# Patient Record
Sex: FEMALE | Race: WHITE | NOT HISPANIC OR LATINO | Employment: FULL TIME | URBAN - METROPOLITAN AREA
[De-identification: names, ages, dates, MRNs, and addresses within clinical notes are randomized per-mention and may not be internally consistent; named-entity substitution may affect disease eponyms.]

---

## 2017-01-08 ENCOUNTER — COMMUNICATION - HEALTHEAST (OUTPATIENT)
Dept: FAMILY MEDICINE | Facility: CLINIC | Age: 37
End: 2017-01-08

## 2017-01-08 DIAGNOSIS — J30.9 ALLERGIC RHINITIS: ICD-10-CM

## 2017-03-04 ENCOUNTER — OFFICE VISIT - HEALTHEAST (OUTPATIENT)
Dept: FAMILY MEDICINE | Facility: CLINIC | Age: 37
End: 2017-03-04

## 2017-03-04 DIAGNOSIS — S80.811A ABRASION, LOWER LEG, ANTERIOR, RIGHT, INITIAL ENCOUNTER: ICD-10-CM

## 2017-04-07 ENCOUNTER — OFFICE VISIT - HEALTHEAST (OUTPATIENT)
Dept: FAMILY MEDICINE | Facility: CLINIC | Age: 37
End: 2017-04-07

## 2017-04-07 DIAGNOSIS — L29.9 ITCHING: ICD-10-CM

## 2017-06-05 ENCOUNTER — OFFICE VISIT - HEALTHEAST (OUTPATIENT)
Dept: FAMILY MEDICINE | Facility: CLINIC | Age: 37
End: 2017-06-05

## 2017-06-05 ENCOUNTER — RECORDS - HEALTHEAST (OUTPATIENT)
Dept: GENERAL RADIOLOGY | Age: 37
End: 2017-06-05

## 2017-06-05 DIAGNOSIS — S99.911A RIGHT ANKLE INJURY, INITIAL ENCOUNTER: ICD-10-CM

## 2017-06-05 DIAGNOSIS — S99.911A UNSPECIFIED INJURY OF RIGHT ANKLE, INITIAL ENCOUNTER: ICD-10-CM

## 2017-06-05 DIAGNOSIS — S96.911A RIGHT ANKLE STRAIN, INITIAL ENCOUNTER: ICD-10-CM

## 2017-06-15 ENCOUNTER — COMMUNICATION - HEALTHEAST (OUTPATIENT)
Dept: FAMILY MEDICINE | Facility: CLINIC | Age: 37
End: 2017-06-15

## 2017-11-28 ENCOUNTER — OFFICE VISIT - HEALTHEAST (OUTPATIENT)
Dept: FAMILY MEDICINE | Facility: CLINIC | Age: 37
End: 2017-11-28

## 2017-11-28 DIAGNOSIS — R21 RASH: ICD-10-CM

## 2018-01-18 ENCOUNTER — COMMUNICATION - HEALTHEAST (OUTPATIENT)
Dept: FAMILY MEDICINE | Facility: CLINIC | Age: 38
End: 2018-01-18

## 2018-01-26 ENCOUNTER — COMMUNICATION - HEALTHEAST (OUTPATIENT)
Dept: FAMILY MEDICINE | Facility: CLINIC | Age: 38
End: 2018-01-26

## 2018-02-05 ENCOUNTER — COMMUNICATION - HEALTHEAST (OUTPATIENT)
Dept: FAMILY MEDICINE | Facility: CLINIC | Age: 38
End: 2018-02-05

## 2018-02-05 ENCOUNTER — OFFICE VISIT - HEALTHEAST (OUTPATIENT)
Dept: FAMILY MEDICINE | Facility: CLINIC | Age: 38
End: 2018-02-05

## 2018-02-05 DIAGNOSIS — F30.9 BIPOLAR I DISORDER, SINGLE MANIC EPISODE (H): ICD-10-CM

## 2018-02-05 DIAGNOSIS — J30.9 ALLERGIC RHINITIS: ICD-10-CM

## 2018-02-05 DIAGNOSIS — Z72.0 TOBACCO ABUSE: ICD-10-CM

## 2018-02-05 DIAGNOSIS — Z23 NEED FOR VACCINATION: ICD-10-CM

## 2018-02-05 DIAGNOSIS — N92.6 IRREGULAR MENSTRUAL CYCLE: ICD-10-CM

## 2018-02-05 DIAGNOSIS — J45.20 MILD INTERMITTENT ASTHMA WITHOUT COMPLICATION: ICD-10-CM

## 2018-02-05 DIAGNOSIS — R42 DIZZY SPELLS: ICD-10-CM

## 2018-02-05 LAB
FASTING STATUS PATIENT QL REPORTED: NO
GLUCOSE BLD-MCNC: 108 MG/DL (ref 74–125)
HCG UR QL: NEGATIVE
HGB BLD-MCNC: 13.2 G/DL (ref 12–16)
SP GR UR STRIP: >=1.03 (ref 1–1.03)
TSH SERPL DL<=0.005 MIU/L-ACNC: 0.82 UIU/ML (ref 0.3–5)

## 2018-03-21 ENCOUNTER — COMMUNICATION - HEALTHEAST (OUTPATIENT)
Dept: FAMILY MEDICINE | Facility: CLINIC | Age: 38
End: 2018-03-21

## 2018-04-24 ENCOUNTER — OFFICE VISIT - HEALTHEAST (OUTPATIENT)
Dept: FAMILY MEDICINE | Facility: CLINIC | Age: 38
End: 2018-04-24

## 2018-04-24 DIAGNOSIS — N39.0 UTI (URINARY TRACT INFECTION): ICD-10-CM

## 2018-04-24 DIAGNOSIS — R35.0 URINE FREQUENCY: ICD-10-CM

## 2018-04-24 DIAGNOSIS — J45.20 MILD INTERMITTENT ASTHMA WITHOUT COMPLICATION: ICD-10-CM

## 2018-04-24 LAB
ALBUMIN UR-MCNC: ABNORMAL MG/DL
APPEARANCE UR: ABNORMAL
BILIRUB UR QL STRIP: ABNORMAL
COLOR UR AUTO: YELLOW
GLUCOSE UR STRIP-MCNC: NEGATIVE MG/DL
HGB UR QL STRIP: ABNORMAL
KETONES UR STRIP-MCNC: ABNORMAL MG/DL
LEUKOCYTE ESTERASE UR QL STRIP: ABNORMAL
NITRATE UR QL: POSITIVE
PH UR STRIP: 5.5 [PH] (ref 5–8)
SP GR UR STRIP: >=1.03 (ref 1–1.03)
UROBILINOGEN UR STRIP-ACNC: ABNORMAL

## 2018-04-24 RX ORDER — ALBUTEROL SULFATE 90 UG/1
2 AEROSOL, METERED RESPIRATORY (INHALATION) EVERY 6 HOURS PRN
Qty: 1 INHALER | Refills: 1 | Status: SHIPPED | OUTPATIENT
Start: 2018-04-24 | End: 2021-12-29

## 2018-04-26 LAB — BACTERIA SPEC CULT: ABNORMAL

## 2018-05-30 ENCOUNTER — COMMUNICATION - HEALTHEAST (OUTPATIENT)
Dept: FAMILY MEDICINE | Facility: CLINIC | Age: 38
End: 2018-05-30

## 2018-05-30 DIAGNOSIS — F30.9 BIPOLAR I DISORDER, SINGLE MANIC EPISODE (H): ICD-10-CM

## 2018-05-30 DIAGNOSIS — F90.2 ATTENTION DEFICIT HYPERACTIVITY DISORDER (ADHD), COMBINED TYPE: ICD-10-CM

## 2018-05-30 DIAGNOSIS — F10.10 ALCOHOL ABUSE: ICD-10-CM

## 2018-05-30 RX ORDER — GABAPENTIN 600 MG/1
600 TABLET ORAL 3 TIMES DAILY
Qty: 90 TABLET | Refills: 0 | Status: SHIPPED | OUTPATIENT
Start: 2018-05-30 | End: 2021-12-29

## 2018-06-15 ENCOUNTER — COMMUNICATION - HEALTHEAST (OUTPATIENT)
Dept: FAMILY MEDICINE | Facility: CLINIC | Age: 38
End: 2018-06-15

## 2018-07-03 ENCOUNTER — COMMUNICATION - HEALTHEAST (OUTPATIENT)
Dept: FAMILY MEDICINE | Facility: CLINIC | Age: 38
End: 2018-07-03

## 2018-07-03 ENCOUNTER — OFFICE VISIT - HEALTHEAST (OUTPATIENT)
Dept: FAMILY MEDICINE | Facility: CLINIC | Age: 38
End: 2018-07-03

## 2018-07-03 DIAGNOSIS — N93.0 PCB (POST COITAL BLEEDING): ICD-10-CM

## 2018-07-03 DIAGNOSIS — R39.9 LOWER URINARY TRACT SYMPTOMS (LUTS): ICD-10-CM

## 2018-07-03 LAB
ALBUMIN UR-MCNC: ABNORMAL MG/DL
APPEARANCE UR: ABNORMAL
BACTERIA #/AREA URNS HPF: ABNORMAL HPF
BILIRUB UR QL STRIP: ABNORMAL
CLUE CELLS: ABNORMAL
COLOR UR AUTO: YELLOW
GLUCOSE UR STRIP-MCNC: NEGATIVE MG/DL
HCG UR QL: NEGATIVE
HGB UR QL STRIP: ABNORMAL
KETONES UR STRIP-MCNC: ABNORMAL MG/DL
LEUKOCYTE ESTERASE UR QL STRIP: ABNORMAL
NITRATE UR QL: NEGATIVE
PH UR STRIP: 5.5 [PH] (ref 5–8)
RBC #/AREA URNS AUTO: ABNORMAL HPF
SP GR UR STRIP: 1.02 (ref 1–1.03)
SP GR UR STRIP: >=1.03 (ref 1–1.03)
SQUAMOUS #/AREA URNS AUTO: ABNORMAL LPF
TRICHOMONAS, WET PREP: ABNORMAL
UROBILINOGEN UR STRIP-ACNC: ABNORMAL
WBC #/AREA URNS AUTO: >100 HPF
YEAST, WET PREP: ABNORMAL

## 2018-07-05 LAB
C TRACH DNA SPEC QL PROBE+SIG AMP: NEGATIVE
N GONORRHOEA DNA SPEC QL NAA+PROBE: NEGATIVE

## 2018-07-06 LAB — BACTERIA SPEC CULT: ABNORMAL

## 2018-08-11 ENCOUNTER — COMMUNICATION - HEALTHEAST (OUTPATIENT)
Dept: FAMILY MEDICINE | Facility: CLINIC | Age: 38
End: 2018-08-11

## 2018-08-13 ENCOUNTER — COMMUNICATION - HEALTHEAST (OUTPATIENT)
Dept: FAMILY MEDICINE | Facility: CLINIC | Age: 38
End: 2018-08-13

## 2018-08-13 DIAGNOSIS — F30.9 BIPOLAR I DISORDER, SINGLE MANIC EPISODE (H): ICD-10-CM

## 2018-09-12 ENCOUNTER — OFFICE VISIT - HEALTHEAST (OUTPATIENT)
Dept: FAMILY MEDICINE | Facility: CLINIC | Age: 38
End: 2018-09-12

## 2018-09-12 DIAGNOSIS — S46.811A STRAIN OF TRAPEZIUS MUSCLE, RIGHT, INITIAL ENCOUNTER: ICD-10-CM

## 2018-09-12 RX ORDER — TIZANIDINE 2 MG/1
2 TABLET ORAL EVERY 8 HOURS PRN
Qty: 15 TABLET | Refills: 0 | Status: SHIPPED | OUTPATIENT
Start: 2018-09-12 | End: 2021-12-29

## 2018-09-13 ENCOUNTER — COMMUNICATION - HEALTHEAST (OUTPATIENT)
Dept: SCHEDULING | Facility: CLINIC | Age: 38
End: 2018-09-13

## 2019-03-22 ENCOUNTER — OFFICE VISIT - HEALTHEAST (OUTPATIENT)
Dept: FAMILY MEDICINE | Facility: CLINIC | Age: 39
End: 2019-03-22

## 2019-03-22 DIAGNOSIS — M25.551 HIP PAIN, RIGHT: ICD-10-CM

## 2019-03-22 DIAGNOSIS — M25.561 ACUTE PAIN OF RIGHT KNEE: ICD-10-CM

## 2019-03-22 RX ORDER — IBUPROFEN 600 MG/1
600 TABLET, FILM COATED ORAL EVERY 6 HOURS PRN
Qty: 60 TABLET | Refills: 1 | Status: SHIPPED | OUTPATIENT
Start: 2019-03-22

## 2019-03-22 RX ORDER — CYCLOBENZAPRINE HCL 10 MG
5-10 TABLET ORAL EVERY 8 HOURS PRN
Qty: 30 TABLET | Refills: 0 | Status: SHIPPED | OUTPATIENT
Start: 2019-03-22 | End: 2021-12-29

## 2020-04-15 ENCOUNTER — COMMUNICATION - HEALTHEAST (OUTPATIENT)
Dept: FAMILY MEDICINE | Facility: CLINIC | Age: 40
End: 2020-04-15

## 2020-04-26 ENCOUNTER — OFFICE VISIT - HEALTHEAST (OUTPATIENT)
Dept: FAMILY MEDICINE | Facility: CLINIC | Age: 40
End: 2020-04-26

## 2020-04-26 DIAGNOSIS — Z88.9 ATOPY: ICD-10-CM

## 2020-04-28 ENCOUNTER — OFFICE VISIT - HEALTHEAST (OUTPATIENT)
Dept: FAMILY MEDICINE | Facility: CLINIC | Age: 40
End: 2020-04-28

## 2020-04-28 DIAGNOSIS — N89.8 VAGINAL ITCHING: ICD-10-CM

## 2020-04-28 DIAGNOSIS — A59.9 TRICHOMONAS INFECTION: ICD-10-CM

## 2020-04-28 DIAGNOSIS — F30.9 BIPOLAR I DISORDER, SINGLE MANIC EPISODE (H): ICD-10-CM

## 2020-04-28 DIAGNOSIS — Z88.9 ATOPY: ICD-10-CM

## 2020-04-28 DIAGNOSIS — Z20.2 STD EXPOSURE: ICD-10-CM

## 2020-04-28 DIAGNOSIS — F41.9 ANXIETY: ICD-10-CM

## 2020-04-28 DIAGNOSIS — R21 RASH: ICD-10-CM

## 2020-04-28 DIAGNOSIS — F10.10 ALCOHOL ABUSE: ICD-10-CM

## 2020-04-28 DIAGNOSIS — Z11.3 SCREEN FOR STD (SEXUALLY TRANSMITTED DISEASE): ICD-10-CM

## 2020-04-28 LAB
CLUE CELLS: ABNORMAL
HIV 1+2 AB+HIV1 P24 AG SERPL QL IA: NEGATIVE
TRICHOMONAS, WET PREP: ABNORMAL
YEAST, WET PREP: ABNORMAL

## 2020-04-28 RX ORDER — HYDROXYZINE PAMOATE 25 MG/1
25 CAPSULE ORAL EVERY 6 HOURS PRN
Qty: 30 CAPSULE | Refills: 0 | Status: SHIPPED | OUTPATIENT
Start: 2020-04-28 | End: 2021-12-29

## 2020-04-28 ASSESSMENT — MIFFLIN-ST. JEOR: SCORE: 1197.52

## 2020-04-29 LAB — T PALLIDUM AB SER QL: NEGATIVE

## 2020-05-01 LAB
C TRACH DNA SPEC QL PROBE+SIG AMP: NEGATIVE
N GONORRHOEA DNA SPEC QL NAA+PROBE: NEGATIVE

## 2020-10-26 ENCOUNTER — OFFICE VISIT - HEALTHEAST (OUTPATIENT)
Dept: FAMILY MEDICINE | Facility: CLINIC | Age: 40
End: 2020-10-26

## 2020-10-26 DIAGNOSIS — N30.00 ACUTE CYSTITIS WITHOUT HEMATURIA: ICD-10-CM

## 2020-10-26 DIAGNOSIS — R30.0 DYSURIA: ICD-10-CM

## 2020-10-26 LAB
ALBUMIN UR-MCNC: NEGATIVE MG/DL
APPEARANCE UR: CLEAR
BACTERIA #/AREA URNS HPF: ABNORMAL HPF
BILIRUB UR QL STRIP: NEGATIVE
COLOR UR AUTO: YELLOW
GLUCOSE UR STRIP-MCNC: NEGATIVE MG/DL
HGB UR QL STRIP: ABNORMAL
KETONES UR STRIP-MCNC: ABNORMAL MG/DL
LEUKOCYTE ESTERASE UR QL STRIP: NEGATIVE
NITRATE UR QL: POSITIVE
PH UR STRIP: 6 [PH] (ref 5–8)
RBC #/AREA URNS AUTO: ABNORMAL HPF
SP GR UR STRIP: >=1.03 (ref 1–1.03)
SQUAMOUS #/AREA URNS AUTO: ABNORMAL LPF
UROBILINOGEN UR STRIP-ACNC: ABNORMAL
WBC #/AREA URNS AUTO: ABNORMAL HPF

## 2020-10-28 LAB — BACTERIA SPEC CULT: ABNORMAL

## 2021-05-24 ENCOUNTER — RECORDS - HEALTHEAST (OUTPATIENT)
Dept: ADMINISTRATIVE | Facility: CLINIC | Age: 41
End: 2021-05-24

## 2021-05-25 ENCOUNTER — RECORDS - HEALTHEAST (OUTPATIENT)
Dept: ADMINISTRATIVE | Facility: CLINIC | Age: 41
End: 2021-05-25

## 2021-05-26 ENCOUNTER — RECORDS - HEALTHEAST (OUTPATIENT)
Dept: ADMINISTRATIVE | Facility: CLINIC | Age: 41
End: 2021-05-26

## 2021-05-26 VITALS
TEMPERATURE: 98.5 F | DIASTOLIC BLOOD PRESSURE: 74 MMHG | HEART RATE: 96 BPM | OXYGEN SATURATION: 99 % | SYSTOLIC BLOOD PRESSURE: 134 MMHG | RESPIRATION RATE: 16 BRPM

## 2021-05-26 NOTE — PROGRESS NOTES
"Subjective:   Shobha Hairston is a(n) 39 y.o. White or  female who presents to [facility] with the following complaint(s):  Knee Pain and Hip Pain    History of Present Illness:  Date of injury: 3/22/2019  Time of injury: Approximately 1800  Location injury occurred: Mother's home  Mechanism of injury: Went to go answer the door and slipped on the wood floor. States she was \"half awake\" and does not recall how she fell. Reports that she fell in a similar manner approximately 2 weeks ago on her mother's deck. Reports also falling in the snow / ice in January.   Symptoms: Has pain over the front of the right knee. Has pain over the right buttock / hip as well. Pain is aching / shooting and throbbing. Has history of low back pain and sciatica on the right. Has not had any spine surgeries. Reports that her right knee has been popping since January.     The following portions of the patient's history were reviewed and updated as appropriate: allergies, current medications, past family history, past medical history, past social history, past surgical history and problem list.    Review of Systems:   Review of Systems   All other systems reviewed and are negative.    Objective:     Vitals:    03/22/19 1854   BP: 127/84   Patient Site: Right Arm   Patient Position: Sitting   Cuff Size: Adult Regular   Pulse: 96   Resp: 18   Temp: 98.4  F (36.9  C)   TempSrc: Oral   SpO2: 99%   Weight: 136 lb (61.7 kg)     Physical Exam   Constitutional: She is oriented to person, place, and time. She appears well-developed and well-nourished.  Non-toxic appearance. No distress.   HENT:   Head: Normocephalic and atraumatic.   Cardiovascular: Normal rate, regular rhythm, S1 normal and S2 normal. Exam reveals no gallop and no friction rub.   No murmur heard.  Pulmonary/Chest: Effort normal and breath sounds normal. No stridor. She has no wheezes. She has no rhonchi. She has no rales.   Musculoskeletal:        Right hip: She " exhibits tenderness (over the greater trochanter). She exhibits normal range of motion, normal strength, no swelling and no deformity.        Right knee: She exhibits bony tenderness (tibial plateau). She exhibits normal range of motion, no effusion, no ecchymosis, no deformity, no laceration, no erythema, no LCL laxity, normal patellar mobility and no MCL laxity. No medial joint line, no lateral joint line, no MCL, no LCL and no patellar tendon tenderness noted.   Neurological: She is alert and oriented to person, place, and time. She has normal strength. No cranial nerve deficit or sensory deficit.   Skin: Skin is warm and dry. No pallor.   Nursing note and vitals reviewed.    Laboratory:  N/A    Radiology:  EXAM DATE: 03/22/2019  EXAM: X-RAY KNEE, RIGHT, 3 VIEWS  LOCATION: [facility]  DATE/TIME: 3/22/2019 7:15 PM  INDICATION: Fall, knee pain  COMPARISON: None.  FINDINGS: Negative knee. No fracture or dislocation. No joint effusion.    EXAM DATE: 03/22/2019  EXAM: X-RAY HIP, RIGHT, MINIMUM 2 VIEWS  LOCATION: [facility]  DATE/TIME: 3/22/2019 7:30 PM  INDICATION: Fall, hip pain  COMPARISON: None.  FINDINGS: Negative hip. No fracture or dislocation.    Electrocardiogram:  N/A    Assessment/Plan   1. Acute pain of right knee  - XR Knee Right Plus Sunrise VW  - ibuprofen (ADVIL,MOTRIN) 600 MG tablet; Take 1 tablet (600 mg total) by mouth every 6 (six) hours as needed for pain.  Dispense: 60 tablet; Refill: 1  - cyclobenzaprine (FLEXERIL) 10 MG tablet; Take 0.5-1 tablets (5-10 mg total) by mouth every 8 (eight) hours as needed for muscle spasms.  Dispense: 30 tablet; Refill: 0    2. Hip pain, right  - XR Hip Right 2 or More VWS  - ibuprofen (ADVIL,MOTRIN) 600 MG tablet; Take 1 tablet (600 mg total) by mouth every 6 (six) hours as needed for pain.  Dispense: 60 tablet; Refill: 1  - cyclobenzaprine (FLEXERIL) 10 MG tablet; Take 0.5-1 tablets (5-10 mg total) by mouth every 8 (eight)  hours as needed for muscle spasms.  Dispense: 30 tablet; Refill: 0    - Reviewed x-ray results with the patient. Prescribed ibuprofen as listed above. Instructed patient to take acetaminophen 1000 mg four times a day as needed for pain as well. Also prescribed cyclobenzaprine to be used as needed for muscle spasm.   - Counseled patient regarding assessment and plan for evaluation and treatment. Questions were answered. See AVS for the specific written instructions and educational handout(s) regarding knee pain and hip contusion that were provided at the conclusion of the visit.   - Discussed signs / symptoms that warrant urgent / emergent medical attention.   - Follow up with PCP in 1 week with consideration for Physical Therapy referral and / or MRI of the knee due to persistent issues following multiple falls.     Walt Thomas MD

## 2021-05-27 ENCOUNTER — RECORDS - HEALTHEAST (OUTPATIENT)
Dept: ADMINISTRATIVE | Facility: CLINIC | Age: 41
End: 2021-05-27

## 2021-05-28 ENCOUNTER — RECORDS - HEALTHEAST (OUTPATIENT)
Dept: ADMINISTRATIVE | Facility: CLINIC | Age: 41
End: 2021-05-28

## 2021-05-29 ENCOUNTER — HEALTH MAINTENANCE LETTER (OUTPATIENT)
Age: 41
End: 2021-05-29

## 2021-05-29 ENCOUNTER — RECORDS - HEALTHEAST (OUTPATIENT)
Dept: ADMINISTRATIVE | Facility: CLINIC | Age: 41
End: 2021-05-29

## 2021-05-30 ENCOUNTER — RECORDS - HEALTHEAST (OUTPATIENT)
Dept: ADMINISTRATIVE | Facility: CLINIC | Age: 41
End: 2021-05-30

## 2021-05-30 VITALS — WEIGHT: 168 LBS | BODY MASS INDEX: 29.76 KG/M2

## 2021-05-30 VITALS — WEIGHT: 165.7 LBS | BODY MASS INDEX: 29.35 KG/M2

## 2021-05-31 ENCOUNTER — RECORDS - HEALTHEAST (OUTPATIENT)
Dept: ADMINISTRATIVE | Facility: CLINIC | Age: 41
End: 2021-05-31

## 2021-05-31 VITALS — WEIGHT: 135 LBS | BODY MASS INDEX: 23.91 KG/M2

## 2021-05-31 VITALS — WEIGHT: 163.4 LBS | BODY MASS INDEX: 28.95 KG/M2

## 2021-05-31 VITALS — BODY MASS INDEX: 28.87 KG/M2 | WEIGHT: 163 LBS

## 2021-06-01 ENCOUNTER — RECORDS - HEALTHEAST (OUTPATIENT)
Dept: ADMINISTRATIVE | Facility: CLINIC | Age: 41
End: 2021-06-01

## 2021-06-01 VITALS — WEIGHT: 129.3 LBS | BODY MASS INDEX: 22.9 KG/M2

## 2021-06-01 VITALS — WEIGHT: 133.38 LBS | BODY MASS INDEX: 23.63 KG/M2

## 2021-06-02 ENCOUNTER — RECORDS - HEALTHEAST (OUTPATIENT)
Dept: ADMINISTRATIVE | Facility: CLINIC | Age: 41
End: 2021-06-02

## 2021-06-02 VITALS — WEIGHT: 128.4 LBS | BODY MASS INDEX: 22.75 KG/M2

## 2021-06-02 VITALS — WEIGHT: 136 LBS | BODY MASS INDEX: 24.09 KG/M2

## 2021-06-04 VITALS
RESPIRATION RATE: 18 BRPM | SYSTOLIC BLOOD PRESSURE: 131 MMHG | DIASTOLIC BLOOD PRESSURE: 80 MMHG | BODY MASS INDEX: 21.62 KG/M2 | WEIGHT: 122 LBS | OXYGEN SATURATION: 96 % | HEART RATE: 117 BPM | HEIGHT: 63 IN

## 2021-06-04 VITALS
HEART RATE: 125 BPM | BODY MASS INDEX: 21.61 KG/M2 | WEIGHT: 122 LBS | RESPIRATION RATE: 16 BRPM | OXYGEN SATURATION: 98 % | SYSTOLIC BLOOD PRESSURE: 121 MMHG | DIASTOLIC BLOOD PRESSURE: 80 MMHG | TEMPERATURE: 98.3 F

## 2021-06-09 NOTE — PROGRESS NOTES
[facility] Note    CC: itching    HPI:   Shobha Hairston is a 37 y.o. old female with past medical history of asthma and bipolar disorder.  Patient came to the walk-in clinic with concern of body itching started last night.  Most of the itching is on her chest, hand and bilateral thighs.  She denies any shortness of breath, fever, neck or tongue swelling, abdominal pain, traveling, changes in her urinary or bowel habit, starting new medication, soap, lotion or detergent.  Patient reports she has been taken Benadryl    Review of Systems   10-point review of systems negative except as noted above.    Past Medical History  Patient Active Problem List    Diagnosis Date Noted     Tobacco abuse 12/21/2015     Allergic Rhinitis      Intertrigo      Asthma With Acute Exacerbation      Attention-deficit Hyperactivity Disorder      Nephrolithiasis      Abnormal Pap Smear Of Cervix      Overweight (BMI 25.0-29.9)      Menorrhagia      Mild intermittent asthma without complication      Pyelonephritis      Ovarian Cyst      Bipolar Disorder      Alcohol Abuse        No past medical history on file.    Medications   Current Outpatient Prescriptions on File Prior to Visit   Medication Sig Dispense Refill     albuterol (PROVENTIL HFA;VENTOLIN HFA) 90 mcg/actuation inhaler Inhale 2 puffs every 6 (six) hours as needed for wheezing. 1 Inhaler 1     albuterol (PROVENTIL) 2.5 mg /3 mL (0.083 %) nebulizer solution Take 3 mL (2.5 mg total) by nebulization every 6 (six) hours as needed for wheezing. 75 mL 0     beclomethasone (QVAR) 40 mcg/actuation inhaler Inhale 2 puffs 2 (two) times a day. 1 Inhaler 2     lamoTRIgine (LAMICTAL) 150 MG tablet   0     buPROPion (WELLBUTRIN XL) 150 MG 24 hr tablet   1     fluticasone (FLONASE) 50 mcg/actuation nasal spray USE ONE TO TWO SPRAY(S) IN EACH NOSTRIL ONCE DAILY 16 g 2     gabapentin (NEURONTIN) 600 MG tablet 3 (three) times a day.   1     ibuprofen (ADVIL,MOTRIN) 800 MG tablet Take 1 tablet  (800 mg total) by mouth every 8 (eight) hours as needed for pain. 60 tablet 0     ipratropium-albuterol (DUO-NEB) 0.5-2.5 mg/3 mL nebulizer Take 3 mL by nebulization every 6 (six) hours as needed. 25 vial 2     No current facility-administered medications on file prior to visit.          Physical Exam:  /70  Pulse (!) 102  Temp 98.1  F (36.7  C) (Oral)   Resp 20  Wt 168 lb (76.2 kg)  LMP 03/23/2017  SpO2 100%  BMI 29.76 kg/m2  General appearance: alert, appears stated age and cooperative  Eyes: negative findings: conjunctivae and sclerae normal  Throat: lips, mucosa, and tongue normal; teeth and gums normal  Neck: no adenopathy and thyroid not enlarged, symmetric, no tenderness/mass/nodules  Lungs: clear to auscultation bilaterally  Skin: No discrete lesion or open sore.  However anterior chest and bilateral hand red.  Patient constantly scratching while she was sitting in the room with me.    Assessment/Plan:   Body itching likely allergic dermatitis.  Instructed patient continue to take Benadryl as needed.  We will provide patient with 3 days course of prednisone.  Patient will follow-up if symptoms persist beyond 3 days or if she have any issue with breathing.

## 2021-06-09 NOTE — PROGRESS NOTES
Subjective:   Shobha Hairston is a 37 y.o. female  No question data found.  Chief Complaint   Patient presents with     Knee Injury     R/t, fell x yesterday   Says she was walking her dog yesterday. The dog saw a rabbit and started running. Patient fell on the street, injuring her right knee. Says she tried soak it in the bathtub, but says there is dirt in it. Says she wanted the are cleaned out. Denies any fevers or chills. Admits a lot of pain. Has some bacitracin at home.   Review of Systems  Const - Skin - see HPI  Allergies   Allergen Reactions     Codeine Nausea Only       Current Outpatient Prescriptions:      albuterol (PROVENTIL HFA;VENTOLIN HFA) 90 mcg/actuation inhaler, Inhale 2 puffs every 6 (six) hours as needed for wheezing., Disp: 1 Inhaler, Rfl: 1     albuterol (PROVENTIL) 2.5 mg /3 mL (0.083 %) nebulizer solution, Take 3 mL (2.5 mg total) by nebulization every 6 (six) hours as needed for wheezing., Disp: 75 mL, Rfl: 0     beclomethasone (QVAR) 40 mcg/actuation inhaler, Inhale 2 puffs 2 (two) times a day., Disp: 1 Inhaler, Rfl: 2     fluticasone (FLONASE) 50 mcg/actuation nasal spray, USE ONE TO TWO SPRAY(S) IN EACH NOSTRIL ONCE DAILY, Disp: 16 g, Rfl: 2     gabapentin (NEURONTIN) 600 MG tablet, 3 (three) times a day. , Disp: , Rfl: 1     ipratropium-albuterol (DUO-NEB) 0.5-2.5 mg/3 mL nebulizer, Take 3 mL by nebulization every 6 (six) hours as needed., Disp: 25 vial, Rfl: 2     lamoTRIgine (LAMICTAL) 150 MG tablet, , Disp: , Rfl: 0     buPROPion (WELLBUTRIN XL) 150 MG 24 hr tablet, , Disp: , Rfl: 1  Patient Active Problem List   Diagnosis     Attention-deficit Hyperactivity Disorder     Nephrolithiasis     Abnormal Pap Smear Of Cervix     Overweight (BMI 25.0-29.9)     Menorrhagia     Mild intermittent asthma without complication     Pyelonephritis     Ovarian Cyst     Bipolar Disorder     Alcohol Abuse     Allergic Rhinitis     Intertrigo     Asthma With Acute Exacerbation     Tobacco abuse      Medical History Reviewed  Objective:     Vitals:    03/04/17 1551   BP: 102/70   Pulse: 88   Resp: 18   Temp: 98.8  F (37.1  C)   TempSrc: Oral   SpO2: 100%   Weight: 165 lb 11.2 oz (75.2 kg)    Gen.-patient in no apparent distress  Skin- area just distal of anterior right knee -  mild erythema approximately 3 cm in diameter with a central abrasion approximately 1 cm in diameter.  No exudate or bleeding noted   MS - FROM of both knees, walking without any listing  Assessment - Plan   1. Abrasion, lower leg, anterior, right, initial encounter  Had the area cleaned.  Dressed with bacitracin and bandage.  At this time patient does not have a cellulitis and oral antibiotics were not indicated.  Patient's last tetanus shot was in 2012. See patient instructions.   - ibuprofen (ADVIL,MOTRIN) 800 MG tablet; Take 1 tablet (800 mg total) by mouth every 8 (eight) hours as needed for pain.  Dispense: 60 tablet; Refill: 0    At the conclusion of the encounter, assessment and plan were discussed.   All questions were answered.   The patient or guardian acknowledged understanding and was involved in the decision making regarding the overall care plan.    Patient Instructions   1. Replace bacitracin and bandaging once a day after bath until fully scabbed over  2. Call clinic number if you start having a fever, increased pain, see pus or if you have any other questions  3. Follow up at primary clinic for further concerns about the scrape

## 2021-06-11 NOTE — PROGRESS NOTES
Subjective:      Patient ID: Shobha Hairston is a 37 y.o. female.    Chief Complaint:    HPI  Shobha Hairston is a 37 y.o. female who presents today complaining of right ankle injury.   She reports that she rolled the ankle about 11 AM this morning.  She reports a prior soft tissue injury to this same area when she was 21 yo.  She has not taken any medication for the pain since the injury.      Past Medical History:   Diagnosis Date     Abnormal Pap Smear Of Cervix     Created by Conversion      Alcohol abuse     Created by Conversion  Replacement Utility updated for latest IMO load     Allergic rhinitis     Created by Conversion  Replacement Utility updated for latest IMO load     Attention-deficit Hyperactivity Disorder     Created by Conversion      Bipolar Disorder     Created by Conversion  Replacement Utility updated for latest IMO load     Mild intermittent asthma without complication     Created by Conversion      Nephrolithiasis     Created by Conversion [employer] Annotation: Sep 28 2007  4:38PM - Vanessa Garner: '02, '03      Overweight (BMI 25.0-29.9)     Created by Conversion      Pyelonephritis     Created by Conversion  Replacement Utility updated for latest IMO load     Tobacco abuse 12/21/2015       Past Surgical History:   Procedure Laterality Date     [facility] FRAGMENT KIDNEY STONE/ ESWL      Description: Lithotripsy;  Recorded: 09/28/2007;     NM REVSD      Description: VSD Repair Single;  Recorded: 09/28/2007;       No family history on file.    Social History   Substance Use Topics     Smoking status: Current Every Day Smoker     Types: Cigarettes     Smokeless tobacco: None      Comment: 1 pack per day     Alcohol use None       Review of Systems    Objective:     /90  Pulse (!) 120  Temp 99.3  F (37.4  C) (Oral)   Resp 18  Wt 163 lb 6.4 oz (74.1 kg)  SpO2 99%  BMI 28.95 kg/m2    Physical Exam   Constitutional: She appears well-developed and well-nourished.   Patient unable to walk on  ankle due to pain.   Musculoskeletal:        Right ankle: She exhibits swelling (primarily over lateral malleolus) and ecchymosis. She exhibits no deformity and normal pulse. Tenderness. Lateral malleolus and medial malleolus tenderness found. Achilles tendon exhibits no defect and normal Garcia's test results.     Procedures    Xr Ankle Right 3 Or More Vws    Result Date: 6/5/2017  XR ANKLE RIGHT 3 OR MORE VWS 6/5/2017 5:53 PM INDICATION: Unspecified injury of right ankle, initial encounter COMPARISON: 06/17/2014 FINDINGS: Negative ankle. No fracture or dislocation. A fixation screw is seen in the great toe. Soft tissue swelling is seen over the lateral malleolus. This report was electronically interpreted by: Dr. Kerline Portillo MD ON 06/05/2017 at 18:04     Films personally reviewed.    Assessment / Plan:     1. Right ankle strain, initial encounter  Crutches,Aluminum (Pair)    Ankle brace         Patient Instructions   1) Ibuprofen 600 mg three times daily with food for 10 days, then as needed.  2) Ice to the affected area 20 minutes three times daily for the next two days, then switch to heat.  3) Exercises as per handout.  4) Follow up in 10-14 days if not improving, sooner if worsening.

## 2021-06-14 NOTE — PROGRESS NOTES
Subjective:      Patient ID: Shobha Hairston is a 37 y.o. female.    Chief Complaint:    HPI Shobha Hairston is a 37 y.o. female who presents today complaining of rash on chest and back x 1 day.  She denies taking any new medications or foods.  She has not started any new topical creams, soaps or detergents.  She did lay on someone else's bed for about an hour yesterday.  In that evening she started having a mild itch which erupted into a more diffuse hive-like rash.  She is concerned of possible scabies, bedbugs, or fleas because she does have a dog.  She also has mild cold symptoms like an intermittent cough and nasal running.  She denies any known strep exposures.  She has not had any fevers.        Past Medical History:   Diagnosis Date     Abnormal Pap Smear Of Cervix     Created by Conversion      Alcohol abuse     Created by Conversion  Replacement Utility updated for latest IMO load     Allergic rhinitis     Created by Conversion  Replacement Utility updated for latest IMO load     Attention-deficit Hyperactivity Disorder     Created by Conversion      Bipolar Disorder     Created by Conversion  Replacement Utility updated for latest IMO load     Mild intermittent asthma without complication     Created by Conversion      Nephrolithiasis     Created by Conversion [facility] Annotation: Sep 28 2007  4:38PM - Vanessa Garner: '02, '03      Overweight (BMI 25.0-29.9)     Created by Conversion      Pyelonephritis     Created by Conversion  Replacement Utility updated for latest IMO load     Tobacco abuse 12/21/2015       Past Surgical History:   Procedure Laterality Date     [facility] FRAGMENT KIDNEY STONE/ ESWL      Description: Lithotripsy;  Recorded: 09/28/2007;     OK REVSD      Description: VSD Repair Single;  Recorded: 09/28/2007;       No family history on file.    Social History   Substance Use Topics     Smoking status: Current Every Day Smoker     Types: Cigarettes     Smokeless tobacco: None      Comment: 1  pack per day     Alcohol use None       Review of Systems   Constitutional: Negative for fever.   HENT: Positive for congestion. Negative for sore throat.    Respiratory: Positive for cough.    Gastrointestinal: Negative.  Negative for nausea and vomiting.   Skin: Positive for rash.       Objective:     /60  Pulse (!) 52  Temp 98.5  F (36.9  C) (Oral)   Resp 12  Wt 163 lb (73.9 kg)  LMP 11/01/2017 (Approximate)  SpO2 100%  Breastfeeding? No  BMI 28.87 kg/m2    Physical Exam   Constitutional: She appears well-developed and well-nourished. No distress.   HENT:   Head: Normocephalic and atraumatic.   Right Ear: External ear normal.   Left Ear: External ear normal.   Eyes: Conjunctivae are normal.   Pulmonary/Chest: Effort normal and breath sounds normal. No respiratory distress.   Skin: Rash noted. Rash is urticarial. She is not diaphoretic.   Diffuse hive like rash present on the upper back and chest area.   Psychiatric: She has a normal mood and affect. Her behavior is normal. Judgment and thought content normal.   Nursing note and vitals reviewed.    Labs:  Recent Results (from the past 24 hour(s))   Rapid Strep A Screen-Throat   Result Value Ref Range    Rapid Strep A Antigen No Group A Strep detected, presumptive negative No Group A Strep detected, presumptive negative       Assessment:     Procedures    1. Rash  Rapid Strep A Screen-Throat    predniSONE (DELTASONE) 20 MG tablet    Group A Strep, RNA Direct Detection, Throat         Patient Instructions   1.  Your rapid strep was negative today.  He will only be notified of your confirmatory strep results if they are positive and require an antibiotic.  2. Take two tabs of Prednisone once daily for 5 days.   3. Follow up if rash does not improve or returns after Prednisone is completed.

## 2021-06-15 NOTE — PROGRESS NOTES
ASSESSMENT/PLAN  1. Dizzy spells  Unclear etiology but will rule out pregnancy given somewhat irregular/lighter menstrual cycles.  Also check hemoglobin, thyroid, and glucose to rule out other metabolic abnormalities.  Patient reassured regarding no significant findings on exam today.  Strongly encourage her to follow-up if she is worse or not improving.    - Pregnancy, Urine  - Hemoglobin  - Thyroid Cascade  - Glucose    2. Irregular menstrual cycle  - Pregnancy, Urine    3. Need for vaccination  - Influenza, Seasonal,Quad Inj, 36+ MOS    4. Allergic rhinitis  - fluticasone (FLONASE) 50 mcg/actuation nasal spray; USE ONE TO TWO SPRAY(S) IN EACH NOSTRIL ONCE DAILY  Dispense: 16 g; Refill: 2    5. Tobacco abuse  Strongly encouraged smoking cessation.  Patient not interested in quitting at this time.    6. Bipolar Disorder  Patient will be given 30 days of meds to get her back on track.  They are prescribed as per med list.  Patient needs to follow-up with psychiatry for ongoing management.  Referral completed.  She will contact them to reestablish care.  - Ambulatory referral to Psychiatry    7. Mild intermittent asthma without complication  As above, strongly encourage smoking cessation.  Patient reports good control using Qvar.  She rarely uses her albuterol.  ACT today is 23.  Asthma action plan updated.      I have counseled the patient for tobacco cessation and the follow up will occur  at the next visit.    Pt states an understanding and agrees to the above plan.            SUBJECTIVE:   Chief Complaint   Patient presents with     Dizziness     C/O dizziness off and on x 2 weeks; work note     Shobha Hairston 38 y.o. female    Current Outpatient Prescriptions   Medication Sig Dispense Refill     albuterol (PROVENTIL HFA;VENTOLIN HFA) 90 mcg/actuation inhaler Inhale 2 puffs every 6 (six) hours as needed for wheezing. 1 Inhaler 1     beclomethasone (QVAR) 40 mcg/actuation inhaler Inhale 2 puffs 2 (two) times a  day. 1 Inhaler 2     fluticasone (FLONASE) 50 mcg/actuation nasal spray USE ONE TO TWO SPRAY(S) IN EACH NOSTRIL ONCE DAILY 16 g 2     gabapentin (NEURONTIN) 600 MG tablet Take 1 tablet (600 mg total) by mouth 3 (three) times a day. 90 tablet 0     lamoTRIgine (LAMICTAL) 150 MG tablet Take 1 tablet (150 mg total) by mouth daily. 30 tablet 0     ibuprofen (ADVIL,MOTRIN) 600 MG tablet Take 1 tablet (600 mg total) by mouth 3 (three) times a day as needed for pain. 30 tablet 1     No current facility-administered medications for this visit.      Allergies: Codeine   Patient's last menstrual period was 02/02/2018 (exact date).    HPI:   Shobha is a 38-year-old female is in today for concern regarding dizziness on and off for the last couple weeks.  She does request a note to be off of work today she is off tomorrow and return on Wednesday.  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  She states she often will not eat regularly and that may be contributing.  She intends to back off at work a bit.  Patient has history of bipolar and notes that she has been off her meds now for couple months.  She is wondering if he can get her interim prescription while she gets reestablished with psychiatry.  They have dropped her because she had been missing too many appointments due to work.  Shobha has history of asthma.  She states is currently well-controlled using daily Qvar.  She has albuterol but rarely uses it (she estimates 2 times per month).  She has not done her seasonal flu shot and would like to do that here today.  She continues to smoke at least half pack per day.  She has no interest in support or help with quitting at this time.    ROS: negative except as per HPI    OBJECTIVE:   The patient appears well, alert, oriented x 3, in no distress.  /80 (Patient Site: Left Arm, Patient Position: Sitting, Cuff Size: Adult Regular)  Pulse 68  Temp 98.9  F (37.2  C)  (Oral)   Resp 14  Wt 135 lb (61.2 kg)  LMP 02/02/2018 (Exact Date)  BMI 23.91 kg/m2    HEENT:  Nose/mouth moist, no erythema/lesions. Neck supple. No adenopathy or thyromegaly.   Ears: TM's appear normal bilaterally w/o fluid or erythema  Lungs: clear, good air entry, no wheezes, rhonchi or rales.   Cardiac: S1 and S2 normal, no murmurs, regular rate and rhythm.   Neurological: normal, no focal findings.  Skin: clear, dry, no rashes/lesions    Results for orders placed or performed in visit on 02/05/18   Pregnancy, Urine   Result Value Ref Range    Pregnancy Test, Urine Negative Negative    Specific Gravity, UA >=1.030 1.001 - 1.030   Hemoglobin   Result Value Ref Range    Hemoglobin 13.2 12.0 - 16.0 g/dL   Glucose   Result Value Ref Range    Glucose 108 74 - 125 mg/dL    Patient Fasting > 8hrs? No

## 2021-06-17 NOTE — PROGRESS NOTES
ASSESSMENT:  1. UTI (urinary tract infection)  ibuprofen (ADVIL,MOTRIN) 600 MG tablet   2. Urine frequency  Urinalysis-UC if Indicated    Culture, Urine   3. Mild intermittent asthma without complication         PLAN:  Patient consistent with UTI versus early kidney infection.  Treat with antibiotics as ordered.  Follow-up if not improving as expected or if symptoms are worsening with nausea and vomiting or fevers.  No fevers as of yet.  Refills given of ibuprofen to use for pain and rescue inhaler, asthma action plan and control test updated today.    No problem-specific Assessment & Plan notes found for this encounter.      There are no Patient Instructions on file for this visit.    Orders Placed This Encounter   Procedures     Culture, Urine     Urinalysis-UC if Indicated     Medications Discontinued During This Encounter   Medication Reason     albuterol (PROVENTIL HFA;VENTOLIN HFA) 90 mcg/actuation inhaler Reorder     ibuprofen (ADVIL,MOTRIN) 600 MG tablet Reorder       No Follow-up on file.    CHIEF COMPLAINT:  Chief Complaint   Patient presents with     Urinary Tract Infection     c/o possible UTI, pressure and burning x 2 wks     Back Pain     c/o back/flank pain, possible a kidney infection x2 wk     Nausea     nausea last night due to pain.        HISTORY OF PRESENT ILLNESS:  Shobha is a 38 y.o. female here today for evaluation of symptoms of UTI.  Patient does have history of kidney stones.  She has had symptoms of urinary tract infection for 2 weeks with pressure, burning, frequency.  She has had some right-sided flank pain for 2 weeks on and off, worsening right recently.  She has not had a kidney stone for many years.  She has had some increased work stress and work hours which have prevented her from remaining hydrated adequately, has also been drinking more soda and coffee than usual so she worries a bit about kidney stones because of this.  She had some nausea last night related the pain but this  SW/CM Discharge Plan  Informed patient is ready for discharge. Patient’s discharge destination is [facility] in [place]. Patient to be picked up by [facility]  [phone].  Discharge plan communicated to patient, patient's sister, Luma and brother in [place], bedside nurse..    After Visit Summary - Transition Report Information  Receiving Agency/Facility: [facility]  Receiving Agency/Facility address: [address] [place] [address]  Receiving Agency/Facility city/state: [place] [address]  Receiving Agency/Facility Type: Skilled Nursing Facility   is manageable at this time.  No ongoing fevers or chills.  She did notice some blood-tinged to her urine.    Patient also needs refill of her rescue inhaler today, symptoms of asthma are under very good control currently with use of Qvar and rescue inhaler as needed.  She sometimes needs her rescue inhaler during allergy season but otherwise does not use it on a frequent basis per    REVIEW OF SYSTEMS:      Pertinent items are noted in HPI.  All other systems are negative  PFSH:  Reviewed, no changes      TOBACCO USE:  History   Smoking Status     Current Every Day Smoker     Types: Cigarettes   Smokeless Tobacco     Never Used     Comment: 1 pack per day       VITALS:  Vitals:    04/24/18 1403   BP: 100/72   Patient Site: Right Arm   Patient Position: Sitting   Cuff Size: Adult Regular   Pulse: 72   Resp: 14   Weight: 133 lb 6 oz (60.5 kg)     Wt Readings from Last 3 Encounters:   04/24/18 133 lb 6 oz (60.5 kg)   02/05/18 135 lb (61.2 kg)   11/28/17 163 lb (73.9 kg)       PHYSICAL EXAM:   /72 (Patient Site: Right Arm, Patient Position: Sitting, Cuff Size: Adult Regular)  Pulse 72  Resp 14  Wt 133 lb 6 oz (60.5 kg)  BMI 23.63 kg/m2  General appearance: alert, appears stated age and cooperative  Back: right CVA tenderness  Lungs: clear to auscultation bilaterally  Heart: regular rate and rhythm, S1, S2 normal, no murmur, click, rub or gallop  Abdomen: soft, non-tender; bowel sounds normal; no masses,  no organomegaly  Psychologic: Mood and affect normal.      DATA REVIEWED:  Additional History from Old Records Summarized (2): 2  Decision to Obtain Records (1): 0  Radiology Tests Summarized or Ordered (1): 0  Labs Reviewed or Ordered (1):   Medicine Test Summarized or Ordered (1): 0  Independent Review of EKG or X-RAY(2 each): 0    The visit lasted a total of 20 minutes face to face with the patient. Over 50% of the time was spent counseling and educating the patient about plan of  care.    MEDICATIONS:  Current Outpatient Prescriptions   Medication Sig Dispense Refill     albuterol (PROAIR HFA;PROVENTIL HFA;VENTOLIN HFA) 90 mcg/actuation inhaler Inhale 2 puffs every 6 (six) hours as needed for wheezing. 1 Inhaler 1     beclomethasone (QVAR) 40 mcg/actuation inhaler Inhale 2 puffs 2 (two) times a day. 1 Inhaler 2     fluticasone (FLONASE) 50 mcg/actuation nasal spray USE ONE TO TWO SPRAY(S) IN EACH NOSTRIL ONCE DAILY 16 g 2     gabapentin (NEURONTIN) 600 MG tablet Take 1 tablet (600 mg total) by mouth 3 (three) times a day. 90 tablet 0     ibuprofen (ADVIL,MOTRIN) 600 MG tablet Take 1 tablet (600 mg total) by mouth 3 (three) times a day as needed for pain. 30 tablet 1     lamoTRIgine (LAMICTAL) 150 MG tablet Take 1 tablet (150 mg total) by mouth daily. 30 tablet 0     nitrofurantoin, macrocrystal-monohydrate, (MACROBID) 100 MG capsule Take 1 capsule (100 mg total) by mouth 2 (two) times a day for 7 days. 14 capsule 0     No current facility-administered medications for this visit.        This note has been dictated using voice recognition software. Any grammatical or context distortions are unintentional and inherent to the software

## 2021-06-17 NOTE — PATIENT INSTRUCTIONS - HE
Patient Instructions by Walt Thomas MD at 3/22/2019  6:50 PM     Author: Walt Thomas MD Service: -- Author Type: Physician    Filed: 3/22/2019  7:44 PM Encounter Date: 3/22/2019 Status: Addendum    : Walt Thomas MD (Physician)    Related Notes: Original Note by Walt Thomas MD (Physician) filed at 3/22/2019  7:44 PM       - X-rays of both your right knee and hip are negative / normal.   - Take prescription ibuprofen 600 mg four times a day with food as needed for pain.   - Take over the counter acetaminophen 1000 mg four times a day as needed for additional pain control.   - Take cyclobenzaprine only as needed for muscle pain / spasm. This medication can be sedating. Do not drive within 10 hours of taking it. Do not drink alcoholic beverages while using this medication. Do not take other sedating medications while using this medication.   - See Dr. Garner for follow up within 1 week. MRI and / or Physical Therapy referral may be indicated.       Patient Education     Knee Pain  Knee pain is very common. Its especially common in active people who put a lot of pressure on their knees, like runners. It affects women more often than men.  Your kneecap (patella) is a thick, round bone. It covers and protects the front portion of your knee joint. It moves along a groove in your thighbone (femur) as part of the patellofemoral joint. A layer of cartilage surrounds the underside of your kneecap. This layer protects it from grinding against your femur.  When this cartilage softens and breaks down, it can cause knee pain. This is partly because of repetitive stress. The stress irritates the lining of the joint. This causes pain in the underlying bone.  What causes knee pain?  Many things can cause knee pain. You may have more than one cause. Some of these include:    Overuse of the knee joint    The kneecap doesnt line up with the tissue around it    Damage to small nerves in the  area    Damage to the ligament-like structure that holds the kneecap in place (retinaculum)    Breakdown of the bone under the cartilage    Swelling in the soft tissues around the kneecap    Injury  You might be more likely to have knee pain if you:    Exercise a lot    Recently increased the intensity of your workouts    Have a body mass index (BMI) greater than 25    Have poor alignment of your kneecap    Walk with your feet turned overly outward or inward    Have weakness in surrounding muscle groups (inner quad or hip adductor muscles)    Have too much tightness in surrounding muscle groups (hamstrings or iliotibial band)    Have a recent history of injury to the area    Are female  Symptoms of knee pain  This type of knee pain is a dull, aching pain in the front of the knee in the area under and around the kneecap. This pain may start quickly or slowly. Your pain might be worse when you squat, run, or sit for a long time. You might also sometimes feel like your knee is giving out. You may have symptoms in one or both of your knees.  Diagnosing knee pain  Your healthcare provider will ask about your medical history and your symptoms. Be sure to describe any activities that make your knee pain worse. He or she will look at your knee. This will include tests of your range of motion, strength, and areas of pain of your knee. Your knee alignment will be checked.  Your healthcare provider will need to rule out other causes of your knee pain, such as arthritis. You may need an imaging test, such as an X-ray or MRI.  Treatment for knee pain  Treatments that can help ease your symptoms may include:    Avoiding activities for a while that make your pain worse, returning to activity over time    Icing the outside of your knee when it causes you pain    Taking over-the-counter pain medicine    Wearing a knee brace or taping your knee to support it    Wearing special shoe inserts to help keep your feet in the proper  alignment    Doing special exercises to stretch and strengthen the muscles around your hip and your knee  These steps help most people manage knee pain. But some cases of knee pain need to be treated with surgery. You may need surgery right away. Or you may need it later if other treatments dont work. Your healthcare provider may refer you to an orthopedic surgeon. He or she will talk with you about your choices.  Preventing knee pain  Losing weight and correcting excess muscle tightness or muscle weakness may help lower your risk.  In some cases, you can prevent knee pain. To help prevent a flare-up of knee pain, you do these things:    Regularly do all the exercises your doctor or physical therapist advises    Support your knee as advised by your doctor or physical therapist    Increase training gradually, and ease up on training when needed    Have an expert check your gait for running or other sporting activities    Stretch properly before and after exercise    Replace your running shoes regularly    Lose excess weight     When to call your healthcare provider  Call your healthcare provider right away if:    Your symptoms dont get better after a few weeks of treatment    You have any new symptoms   Date Last Reviewed: 4/1/2017 2000-2017 [employer]. [address] [place] [address]. All rights reserved. This information is not intended as a substitute for professional medical care. Always follow your healthcare professional's instructions.           Patient Education     Hip Contusion    A contusion is another word for a bruise. It happens when small blood vessels break open and leak blood into the nearby area. A hip contusion can result from a bump, hit, or fall. Symptoms of a contusion often include changes in skin color (bruising), swelling, and pain. It may take several hours for a deep bruise to show up. If the injury is severe, you may need an X-ray to check for broken bones.  Swelling should decrease in a few days. Bruising and pain may take several weeks to go away.  Home care    Unless another medicine was prescribed, you may take acetaminophen, ibuprofen, or naproxen to help relieve pain and swelling. If needed, stronger pain medicines may be prescribed. Take all medicines exactly as directed.    Ice the bruised area to help reduce pain and swelling. Wrap a cold source (ice pack or ice cubes in a plastic bag) in a thin towel. Apply the cold source to the bruised area for 20 minutes every 1 to 2 hours the first day. Continue this 3 to 4 times a day until the pain and swelling goes away.    If walking causes pain, use crutches or a walker until you can walk without pain. These items can be rented at most pharmacies and orthopedic supply stores.    If your injury is keeping you from moving around or caring for yourself properly, you may qualify for services such as home healthcare. Check with your doctor and insurance company to see if this type of care is covered.  Follow-up  Follow up with your healthcare provider, or as advised.  When to seek medical advice   Call your healthcare provider right away if any of these occur:    Increased pain, bruising, or swelling near the injured area    Decreased ability to bear weight on the injured side    Pain or swelling develops below the knee    Chest pain or shortness of breath  Date Last Reviewed: 4/1/2017 2000-2017 [employer]. [address] [place] [address], [place] [address]. All rights reserved. This information is not intended as a substitute for professional medical care. Always follow your healthcare professional's instructions.

## 2021-06-18 NOTE — PATIENT INSTRUCTIONS - HE
"Patient Instructions by Ileana Garner CNP at 4/28/2020  3:30 PM     Author: Ileana Garner CNP Service: -- Author Type: Nurse Practitioner    Filed: 4/28/2020  3:40 PM Encounter Date: 4/28/2020 Status: Addendum    : Ileana Garner CNP (Nurse Practitioner)    Related Notes: Original Note by Ileana Garner CNP (Nurse Practitioner) filed at 4/28/2020  3:40 PM       Hydroxyzine for anxiety and itching as needed    Call therapist related to severe anxiety and stress with relationship to discuss and help.     Lukewarm baths and showers.     Warm packs to vaginal cyst that has drained for 20 minutes for the next 2-3 days.      Continue cephalexin.      Try aster \"headspace\" for mindfulness and meditation/stress.  Some of it is free due to covid-19.      ---    STDs: Flagyl is at the pharmacy for trichomonas. Take all of your metronidazole pills at once.  No drinking for 3 days following this - you will get very sick if alcohol is mixed with this medication.     You were treated for gonorrhea and chlamydia today.  No need for further treatment if you test positive for one of these.     No sexual intercourse until retested for trichomonas in about a week - this can be done at your clinic, [facility], etc.   This one time dose only works about 8/10 times.      Patient Education       Patient Education     Trichomonas Vaginal Infection (Trichomoniasis)    You have a Trichomonas vaginal infection. This problem is often called trich. It is caused by a parasite that is passed during sex. This makes trich a sexually transmitted disease (STD). Both men and women can get trich, but it is more common in women.  Most people who have trich dont have any symptoms at first. If symptoms do occur, they may take weeks or months to develop.  Symptoms in women can include:    Thin discharge from the vagina that may smell bad and be clear, white, gray, green, or yellow in color    Itching, burning, redness, or soreness " in or around the vagina    Pain in the lower belly    Frequent urination or pain and burning during urination    Pain during sex  Symptoms in men are not very common. Men may have trich and pass it to women during sex without knowing they were ever infected.  Trich is most often treated with antibiotics. Without treatment, trich can increase the risk of more serious health problems such as:    Pelvic inflammatory disease (PID)     delivery (giving birth to a baby early if youre pregnant)    HIV and certain other sexually transmitted diseases (STDs)  Home care    Take the antibiotics youre prescribed exactly as directed. Finish all of the medicine, even if your symptoms go away.    Avoid drinking alcohol until youre done with your treatment.    Tell any partners you have sex with that you have trich. They will need be tested for trich and possibly treated as well.    Avoid having sex until you and any partners you have sex with are confirmed to no longer have trich.  Prevention  The only way to avoid getting trich or any other STD is to avoid having sex. If you choose to have sex, then take steps to lower your health risks:    Use condoms when having sex.    Limit the number of partners you have sex with.    Get tested regularly for STDs. Ask any partner you have sex with to do the same.    Dont have sex with anyone who has symptoms that may be due to an STD.  Follow-up care  Follow up with your healthcare provider, or as advised. Testing will likely be done to ensure that the infection has cleared.  When to seek medical advice  Call your healthcare provider right away if:    You have a fever of 100.4 F (38 C) or higher, or as directed by your provider.    Your symptoms worsen, or they dont go away even after completing your treatment.    You have new pain in the lower belly or pelvic region.    You have side effects that bother you or a reaction to the medicine youre taking.    You or any partners you have  sex with have new symptoms, such as a rash, joint pain, or sores.  Date Last Reviewed: 10/1/2017    8360-2892 [employer], "[employer]. [address] [place] [address], [place] [address]. All rights reserved. This information is not intended as a substitute for professional medical care. Always follow your healthcare professional's instructions.

## 2021-06-18 NOTE — PATIENT INSTRUCTIONS - HE
Patient Instructions by Ck Laureano PA-C at 4/26/2020  2:00 PM     Author: Ck Laureano PA-C Service: -- Author Type: Physician Assistant    Filed: 4/26/2020  2:06 PM Encounter Date: 4/26/2020 Status: Addendum    : Ck Laureano PA-C (Physician Assistant)    Related Notes: Original Note by Ck Laureano PA-C (Physician Assistant) filed at 4/26/2020  2:05 PM       Cut your fingernails short.  Scrub under the fingernails to keep them clean and dry  Stop scratching if at all possible.  Use of over-the-counter moisturizer such as Eucerin Aquaphor to help with moisturization  Apply topical steroid to the areas that are most problematic and itching and scaly.  Take the oral antibiotic until gone to help with any irritation or infection.    Follow-up with your primary care provider if not getting good resolution or if new symptoms or concerns arise.      Patient Education     Atopic Dermatitis (Adult)  Atopic dermatitis is a dry, itchy, red rash. Its also called eczema. The rash is chronic, or ongoing. It can come and go over time. The disease is often passed down in families. It causes a problem with the skin barrier that makes the skin more sensitive to the environment and other factors. The increased skin sensitivity causes an itch, which causes scratching. Scratching can worsen the itching or also break the skin. This can put the skin at risk of infection.  The condition is most common in people with asthma, hay fever, hives, or dry or sensitive skin. The rash may be caused by extreme heat or heavy sweating. Skin irritants can cause the rash to flare up. These can include wool or silk clothing, grease, oils, some medicines, and harsh soaps and detergents. Emotional stress can also be a trigger.  Treatment is done to relieve the itching and inflammation of the skin.  Home care  Follow these tips to care for your condition:    Keep the areas of rash clean by bathing at least every other day. Use lukewarm water  to bathe. Dont use hot water, which can dry out the skin.    Dont use soaps with strong detergents. Use mild soaps made for sensitive skin.    Apply a cream or ointment to damp skin right after bathing.    Avoid things that irritate your skin. Wear absorbent, soft fabrics next to the skin rather than rough or scratchy materials.    Use mild laundry soap free of scents and perfumes. Make sure to rinse all the soap out of your clothes.    Treat any skin infection as directed.    Use oral diphenhydramine to help reduce itching. This is an antihistamine you can buy at drug and grocery stores. It can make you sleepy, so use lower doses during the daytime. Or you can use loratadine. This is an antihistamine that will not make you sleepy. Do not use diphenhydramine if you have glaucoma or have trouble urinating due to an enlarged prostate.  Follow-up care  See your healthcare provider, or as advised. If your symptoms dont get better or if they get worse in the next 7 days, make an appointment with your healthcare provider.  When to seek medical advice  Call your healthcare provider right away  if any of these occur:    Increasing area of redness or pain in the skin    Yellow crusts or wet drainage from the rash    Fever of 100.4 F (38 C) or higher, or as directed by your healthcare provider  Date Last Reviewed: 9/1/2016 2000-2017 [employer]. [address] [place], [place] [address]. All rights reserved. This information is not intended as a substitute for professional medical care. Always follow your healthcare professional's instructions.           Patient Education     Managing Atopic Dermatitis (Eczema)     After bathing, gently pat your skin dry (dont rub). Apply moisturizer while your skin is still damp.   To manage your symptoms and help reduce the severity and frequency, try these self-care tips:  Caring for your skin    Use a gentle, fragrance-free cleanser (or nonsoap cleanser) for bathing.  Rinse well. Pat skin dry.    Take warm, not hot, baths or showers. Try to limit them to no more that 10 to 15 minutes.     Use moisturizer liberally right after you bathe, while your skin is still damp.    Avoid scratching because it will cause more damage to your skin.     Topical, over-the-counter hydrocortisone cream may help control mild symptoms.   Controlling your environment    Avoid extreme heat or cold.    Avoid very humid or very dry air.    If your home or office air is very dry, use a humidifier.    Avoid allergens, such as dust, that may be present in bedding, carpets, plush toys, or rugs.    Know that pet hair and dander can cause flare-ups.  Seeking medical treatment  Another way to keep symptoms under control is to seek medical treatment. Talk with your healthcare provider about the type of treatment that may work best for you. Your provider may prescribe treatments such as the following:    Topical treatments to put on the skin daily    Medicines taken by mouth (oral medicines), such as antihistamines, antibiotics, or corticosteroids    In severe cases shots (injections) may be needed to control the symptoms. You may even need antibiotics if skin infections occur.  Treatments dont work the same way for every person. So if your symptoms continue or get worse, ask your healthcare provider about other treatments.  Making lifestyle choices    Manage the stress in your life.    Wear loose-fitting cotton clothing that does not bind or rub your skin.    Avoid contact with wool or other scratchy fabrics.    Use fragrance-free products.  Getting good results  Now that you know more about atopic dermatitis, the next step is up to you. Follow your healthcare providers treatment plan and your self-care routine. This will help bring atopic dermatitis under control. If your symptoms persist, be sure to let your health care provider know.   Date Last Reviewed: 2/1/2017 2000-2019 [employer] [address]  [place] [address]. All rights reserved. This information is not intended as a substitute for professional medical care. Always follow your healthcare professional's instructions.

## 2021-06-18 NOTE — PATIENT INSTRUCTIONS - HE
"Patient Instructions by Reinaldo Treadwell CNP at 10/26/2020  6:50 PM     Author: Reinaldo Treadwell CNP Service: -- Author Type: Nurse Practitioner    Filed: 10/26/2020  7:45 PM Encounter Date: 10/26/2020 Status: Signed    : Reinaldo Treadwell CNP (Nurse Practitioner)         Patient Education     Bladder Infection, Female (Adult)    Urine is normally doesn't have any bacteria in it. But bacteria can get into the urinary tract from the skin around the rectum. Or they can travel in the blood from elsewhere in the body. Once they are in your urinary tract, they can cause infection in the urethra (urethritis), the bladder (cystitis), or the kidneys (pyelonephritis).  The most common place for an infection is in the bladder. This is called a bladder infection. This is one of the most common infections in women. Most bladder infections are easily treated. They are not serious unless the infection spreads to the kidney.  The phrases \"bladder infection,\" \"UTI,\" and \"cystitis\" are often used to describe the same thing. But they are not always the same. Cystitis is an inflammation of the bladder. The most common cause of cystitis is an infection.  Symptoms  The infection causes inflammation in the urethra and bladder. This causes many of the symptoms. The most common symptoms of a bladder infection are:    Pain or burning when urinating    Having to urinate more often than usual    Urgent need to urinate    Only a small amount of urine comes out    Blood in urine    Abdominal discomfort. This is usually in the lower abdomen above the pubic bone.    Cloudy urine    Strong- or bad-smelling urine    Unable to urinate (urinary retention)    Unable to hold urine in (urinary incontinence)    Fever    Loss of appetite    Confusion (in older adults)  Causes  Bladder infections are not contagious. You can't get one from someone else, from a toilet seat, or from sharing a bath.  The most common cause of bladder infections is " bacteria from the bowels. The bacteria get onto the skin around the opening of the urethra. From there, they can get into the urine and travel up to the bladder, causing inflammation and infection. This usually happens because of:    Wiping improperly after urinating. Always wipe from front to back.    Bowel incontinence    Pregnancy    Procedures such as having a catheter inserted    Older age    Not emptying your bladder. This can allow bacteria a chance to grow in your urine.    Dehydration    Constipation    Sex    Use of a diaphragm for birth control   Treatment  Bladder infections are diagnosed by a urine test. They are treated with antibiotics and usually clear up quickly without complications. Treatment helps prevent a more serious kidney infection.  Medicines  Medicines can help in the treatment of a bladder infection:    Take antibiotics until they are used up, even if you feel better. It is important to finish them to make sure the infection has cleared.    You can use acetaminophen or ibuprofen for pain, fever, or discomfort, unless another medicine was prescribed. If you have chronic liver or kidney disease, talk with your healthcare provider before using these medicines. Also talk with your provider if you've ever had a stomach ulcer or gastrointestinal bleeding, or are taking blood-thinner medicines.    If you are given phenazopydridine to reduce burning with urination, it will cause your urine to become a bright orange color. This can stain clothing.  Care and prevention  These self-care steps can help prevent future infections:    Drink plenty of fluids to prevent dehydration and flush out your bladder. Do this unless you must restrict fluids for other health reasons, or your doctor told you not to.    Proper cleaning after going to the bathroom is important. Wipe from front to back after using the toilet to prevent the spread of bacteria.    Urinate more often. Don't try to hold urine in for a long  time.    Wear loose-fitting clothes and cotton underwear. Avoid tight-fitting pants.    Improve your diet and prevent constipation. Eat more fresh fruit and vegetables, and fiber, and less junk and fatty foods.    Avoid sex until your symptoms are gone.    Avoid caffeine, alcohol, and spicy foods. These can irritate your bladder.    Urinate right after intercourse to flush out your bladder.    If you use birth control pills and have frequent bladder infections, discuss it with your doctor.  Follow-up care  Call your healthcare provider if all symptoms are not gone after 3 days of treatment. This is especially important if you have repeat infections.  If a culture was done, you will be told if your treatment needs to be changed. If directed, you can call to find out the results.  If X-rays were done, you will be told if the results will affect your treatment.  Call 911  Call 911 if any of the following occur:    Trouble breathing    Hard to wake up or confusion    Fainting or loss of consciousness    Rapid heart rate  When to seek medical advice  Call your healthcare provider right away if any of these occur:    Fever of 100.4 F (38.0 C) or higher, or as directed by your healthcare provider    Symptoms are not better by the third day of treatment    Back or belly (abdominal) pain that gets worse    Repeated vomiting, or unable to keep medicine down    Weakness or dizziness    Vaginal discharge    Pain, redness, or swelling in the outer vaginal area (labia)  Date Last Reviewed: 10/1/2016    9242-5521 [employer]. [address] [place] [address], [place] [address]. All rights reserved. This information is not intended as a substitute for professional medical care. Always follow your healthcare professional's instructions.

## 2021-06-19 NOTE — PROGRESS NOTES
Assessment:     1. Lower urinary tract symptoms (LUTS)  Urinalysis-UC if Indicated    Culture, Urine    Pregnancy, Urine    Wet Prep, Vaginal    CANCELED: Chlamydia trachomatis & Neisseria gonorrhoeae, Amplified Detection   2. PCB (post coital bleeding)  Chlamydia trachomatis & Neisseria gonorrhoeae, Amplified Detection    Wet Prep, Vaginal    Ambulatory referral to Gynecology        Acute cystitis      Plan:  Plan:      1. Medications: TMP/SMX  2. Maintain adequate hydration  3. Follow up if symptoms not improving, and prn.     Subjective:      Shobha Hairston is a 38 y.o. female who complains of dysuria, frequency, hesitancy and incomplete bladder emptying for 2 days.  Patient also complains of upper abdoinal pain. Patient denies vaginal discharge.  Patient does have a history of recurrent UTI.  Patient does have a history of pyelonephritis.  She has had some bleeding with intercourse for about 6 months.    LMP- possibly last monh    The following portions of the patient's history were reviewed and updated as appropriate: allergies, current medications, past family history, past medical history, past social history, past surgical history and problem list.  Allergies   Allergen Reactions     Codeine Nausea Only       Current Outpatient Prescriptions on File Prior to Visit   Medication Sig Dispense Refill     albuterol (PROAIR HFA;PROVENTIL HFA;VENTOLIN HFA) 90 mcg/actuation inhaler Inhale 2 puffs every 6 (six) hours as needed for wheezing. 1 Inhaler 1     beclomethasone (QVAR) 40 mcg/actuation inhaler Inhale 2 puffs 2 (two) times a day. 1 Inhaler 2     fluticasone (FLONASE) 50 mcg/actuation nasal spray USE ONE TO TWO SPRAY(S) IN EACH NOSTRIL ONCE DAILY 16 g 2     gabapentin (NEURONTIN) 600 MG tablet Take 1 tablet (600 mg total) by mouth 3 (three) times a day. 90 tablet 0     lamoTRIgine (LAMICTAL) 150 MG tablet Take 1 tablet (150 mg total) by mouth daily. 30 tablet 0     ibuprofen (ADVIL,MOTRIN) 600 MG tablet  Take 1 tablet (600 mg total) by mouth 3 (three) times a day as needed for pain. 30 tablet 1     No current facility-administered medications on file prior to visit.        Patient Active Problem List   Diagnosis     Attention deficit hyperactivity disorder (ADHD), combined type     Nephrolithiasis     Abnormal Pap Smear Of Cervix     Mild intermittent asthma without complication     H/O pyelonephritis     Ovarian Cyst     Bipolar Disorder     Alcohol Abuse     Allergic Rhinitis     Tobacco abuse       Past Medical History:   Diagnosis Date     Abnormal Pap Smear Of Cervix     Created by Conversion      Alcohol abuse     Created by Conversion  Replacement Utility updated for latest IMO load     Allergic rhinitis     Created by Conversion  Replacement Utility updated for latest IMO load     Attention-deficit Hyperactivity Disorder     Created by Conversion      Bipolar Disorder     Created by Conversion  Replacement Utility updated for latest IMO load     Mild intermittent asthma without complication     Created by Conversion      Nephrolithiasis     Created by Conversion [employer] Annotation: Sep 28 2007  4:38PM - Vanessa Garner: '02, '03      Overweight (BMI 25.0-29.9)     Created by Conversion      Pyelonephritis     Created by Conversion  Replacement Utility updated for latest IMO load     Tobacco abuse 12/21/2015       Past Surgical History:   Procedure Laterality Date     VT FRAGMENT KIDNEY STONE/ ESWL      Description: Lithotripsy;  Recorded: 09/28/2007;     VT REVSD      Description: VSD Repair Single;  Recorded: 09/28/2007;       Family History   Problem Relation Age of Onset     Thyroid disease Mother      Diabetes type II Mother      Hypertension Mother        Social History     Social History     Marital status: Single     Spouse name: N/A     Number of children: N/A     Years of education: N/A     Social History Main Topics     Smoking status: Current Every Day Smoker     Types: Cigarettes     Smokeless  tobacco: Never Used      Comment: 1 pack per day     Alcohol use None     Drug use: No      Comment: past use     Sexual activity: Yes     Partners: Male     Birth control/ protection: None     Other Topics Concern     None     Social History Narrative         Review of Systems  Constitutional: negative  Integument/breast: negative  Hematologic/lymphatic: negative      Objective:      /74 (Patient Site: Left Arm, Patient Position: Sitting, Cuff Size: Adult Regular)  Pulse (!) 102  Temp 99  F (37.2  C) (Oral)   Resp 22  Wt 129 lb 4.8 oz (58.7 kg)  SpO2 97%  BMI 22.9 kg/m2    General:Healthy, alert and in no acute distress  Ears: normal TM's and external ear canals bilateral  Sinus tender: negative  Nose: Enlarged and erythematous turbinates  Mouth: lips, mucosa, and tongue normal. Teeth and gums normal. No tonsillar endangerment   Neck: supple, symmetrical, trachea midline.  Lungs: clear to auscultation bilaterally  Heart: RRR, No murmurs  Abdomen: Soft NTND, No HSM, No peritoneal signs, Rebound negative, Mc Hermann's sign negative, No CVA tenderness.  PELVIC EXAMINATION:  EXT GEN: No lesions.   PERINEUM: Intact. No perineal or external anal lesions.  URETHRA: No meatal lesions, prolapse. No urethral          tenderness or masses  BLADDER: Nontender, no masses  INTROITUS: Well supported. No lesions or other abnormalities  VAGINA: Clean, no bleeding  CERVIX: Normal appearing epithelium. Parous  UTERUS: Nl in size, shape and consistency  ADNEXA: Clear, nontender  Skin: No rashes      Laboratory:   Urine dipstick shows 2+ for leukocyte esterase.    Micro exam: 100 RBCs per HPF.

## 2021-06-20 NOTE — PROGRESS NOTES
Chief Complaint   Patient presents with     Neck Pain     x week. Feels like a huge knot that starts from her lower shoulder blade to her neck. Headaches x week. Has been using Ibuprofen and hasn't been helping.          HPI      Patient is here for a week of moderate to severe constant right neck pain radiating down right shoulder, and right upper back. Pain worsens with movement of neck. No injury. No tingling nor numbness of right arm. She took Ibuprofen without relief. No fever, chills.     ROS: Pertinent ROS noted in HPI.     Allergies   Allergen Reactions     Codeine Nausea Only       Patient Active Problem List   Diagnosis     Attention deficit hyperactivity disorder (ADHD), combined type     Nephrolithiasis     Abnormal Pap Smear Of Cervix     Mild intermittent asthma without complication     H/O pyelonephritis     Ovarian Cyst     Bipolar Disorder     Alcohol Abuse     Allergic Rhinitis     Tobacco abuse       Family History   Problem Relation Age of Onset     Thyroid disease Mother      Diabetes type II Mother      Hypertension Mother        Social History     Social History     Marital status: Single     Spouse name: N/A     Number of children: N/A     Years of education: N/A     Occupational History     Not on file.     Social History Main Topics     Smoking status: Current Every Day Smoker     Types: Cigarettes     Smokeless tobacco: Never Used      Comment: 1 pack per day     Alcohol use Not on file     Drug use: No      Comment: past use     Sexual activity: Yes     Partners: Male     Birth control/ protection: None     Other Topics Concern     Not on file     Social History Narrative         Objective:    Vitals:    09/12/18 1531   BP: 100/50   Pulse: 73   Resp: 16   SpO2: 97%       Gen:NAD  Neck: normal inspection of neck. Mild pain to palpation of right side of neck without cervical spine tenderness to palpation. Restricted ROM of neck in all planes due to pain.  CV: RRR, no M, R, G  Pulm: CTAB,  normal effort  MSK: normal inspection of shoulders, upper extremities and back. Mild pain to palpation of right upper trapezius, and right superior shoulder. No midline spinal tenderness to palpation. Normal palpation of bilateral upper extremities. Full ROM and 5/5 strength of bilateral upper extremities.       Strain of trapezius muscle, right, initial encounter  -     tiZANidine (ZANAFLEX) 2 MG tablet; Take 1 tablet (2 mg total) by mouth every 8 (eight) hours as needed.      Pain is reproducible with palpation of the distribution of right upper trapezius. Cautions about med side effects given. F/u as directed.

## 2021-06-21 ENCOUNTER — OFFICE VISIT - HEALTHEAST (OUTPATIENT)
Dept: FAMILY MEDICINE | Facility: CLINIC | Age: 41
End: 2021-06-21

## 2021-06-21 DIAGNOSIS — L01.00 IMPETIGO: ICD-10-CM

## 2021-06-21 DIAGNOSIS — N30.00 ACUTE CYSTITIS WITHOUT HEMATURIA: ICD-10-CM

## 2021-06-21 DIAGNOSIS — J45.21 MILD INTERMITTENT ASTHMA WITH EXACERBATION: ICD-10-CM

## 2021-06-21 DIAGNOSIS — B35.4 TINEA CORPORIS: ICD-10-CM

## 2021-06-21 LAB
ALBUMIN UR-MCNC: NEGATIVE G/DL
APPEARANCE UR: ABNORMAL
BACTERIA #/AREA URNS HPF: ABNORMAL /[HPF]
BILIRUB UR QL STRIP: NEGATIVE
COLOR UR AUTO: YELLOW
GLUCOSE UR STRIP-MCNC: NEGATIVE MG/DL
HGB UR QL STRIP: ABNORMAL
KETONES UR STRIP-MCNC: NEGATIVE MG/DL
LEUKOCYTE ESTERASE UR QL STRIP: ABNORMAL
MUCOUS THREADS #/AREA URNS LPF: ABNORMAL LPF
NITRATE UR QL: NEGATIVE
PH UR STRIP: 6 [PH] (ref 5–8)
RBC #/AREA URNS AUTO: ABNORMAL HPF
SP GR UR STRIP: >=1.03 (ref 1–1.03)
UROBILINOGEN UR STRIP-ACNC: ABNORMAL
WBC #/AREA URNS AUTO: ABNORMAL HPF

## 2021-06-21 RX ORDER — ALBUTEROL SULFATE 90 UG/1
2 AEROSOL, METERED RESPIRATORY (INHALATION) EVERY 6 HOURS PRN
Qty: 1 INHALER | Refills: 1 | Status: SHIPPED | OUTPATIENT
Start: 2021-06-21 | End: 2021-12-29

## 2021-06-21 NOTE — LETTER
Letter by Reinaldo Treadwell CNP at      Author: Reinaldo Treadwlel CNP Service: -- Author Type: --    Filed:  Encounter Date: 10/26/2020 Status: (Other)         October 26, 2020     Patient: Shobha Hairston   YOB: 1980   Date of Visit: 10/26/2020       To Whom It May Concern:    It is my medical opinion that Shobha Hairston was seen today and may return to work tomorrow. .    If you have any questions or concerns, please don't hesitate to call.    Sincerely,        Electronically signed by Reinaldo Treadwell CNP

## 2021-06-23 LAB — BACTERIA SPEC CULT: ABNORMAL

## 2021-06-29 NOTE — PROGRESS NOTES
Progress Notes by Reinaldo Treadwell CNP at 10/26/2020  6:50 PM     Author: Reinaldo Treadwell CNP Service: -- Author Type: Nurse Practitioner    Filed: 10/26/2020  7:50 PM Encounter Date: 10/26/2020 Status: Signed    : Reinaldo Treadwell CNP (Nurse Practitioner)       Chief Complaint   Patient presents with   ? Strong urine odor     poss kidney infection, have not been feeling well   ? Recurrent Skin Infections     on hands and feet       HPI:  Shobha Hairston is a 40 y.o. female who presents today complaining of ongoing dysuria, frequency and foul odor issues . Reports a history of recurrent UTIs and pyelonephritis. Denies any fevers or chills.     History obtained from the patient.    Problem List:  2015-12: Tobacco abuse  Attention deficit hyperactivity disorder (ADHD), combined type  Nephrolithiasis  Abnormal Pap Smear Of Cervix  Overweight (BMI 25.0-29.9)  Menorrhagia  Mild intermittent asthma without complication  Pain During Urination (Dysuria)  H/O pyelonephritis  Ovarian Cyst  Bipolar Disorder  Alcohol Abuse  Depression With Anxiety  Urinary Tract Infection  Ankle Injury  Allergic Rhinitis  Intertrigo  Asthma With Acute Exacerbation  Amenorrhea      Past Medical History:   Diagnosis Date   ? Abnormal Pap Smear Of Cervix     Created by Conversion    ? Alcohol abuse     Created by Conversion  Replacement Utility updated for latest IMO load   ? Allergic rhinitis     Created by Conversion  Replacement Utility updated for latest IMO load   ? Attention-deficit Hyperactivity Disorder     Created by Conversion    ? Bipolar Disorder     Created by Conversion  Replacement Utility updated for latest IMO load   ? Mild intermittent asthma without complication     Created by Conversion    ? Nephrolithiasis     Created by Conversion [facility] Annotation: Sep 28 2007  4:38PM - Vanessa Garner: '02, '03    ? Overweight (BMI 25.0-29.9)     Created by Conversion    ? Pyelonephritis     Created by Conversion   Replacement Utility updated for latest IMO load   ? Tobacco abuse 12/21/2015       Social History     Tobacco Use   ? Smoking status: Current Every Day Smoker     Types: Cigarettes   ? Smokeless tobacco: Never Used   ? Tobacco comment: 1 pack per day   Substance Use Topics   ? Alcohol use: Not on file       Review of Systems   Constitutional: Negative for activity change, appetite change, chills and fever.   Gastrointestinal: Negative for abdominal pain, nausea and vomiting.   Genitourinary: Positive for dysuria, frequency and urgency. Negative for flank pain, vaginal bleeding and vaginal discharge.   Musculoskeletal: Negative for back pain and myalgias.   All other systems reviewed and are negative.      Vitals:    10/26/20 1918   BP: 134/74   Patient Site: Right Arm   Patient Position: Sitting   Cuff Size: Adult Regular   Pulse: 96   Resp: 16   Temp: 98.5  F (36.9  C)   TempSrc: Oral   SpO2: 99%       Physical Exam  Constitutional:       Appearance: Normal appearance.   Cardiovascular:      Rate and Rhythm: Normal rate and regular rhythm.      Pulses: Normal pulses.      Heart sounds: Normal heart sounds.   Pulmonary:      Effort: Pulmonary effort is normal.      Breath sounds: Normal breath sounds.   Abdominal:      General: There is no distension.      Palpations: Abdomen is soft. There is no mass.      Tenderness: There is no abdominal tenderness. There is no right CVA tenderness, left CVA tenderness, guarding or rebound.   Skin:     General: Skin is warm and dry.      Capillary Refill: Capillary refill takes less than 2 seconds.   Neurological:      General: No focal deficit present.      Mental Status: She is alert and oriented to person, place, and time. Mental status is at baseline.   Psychiatric:         Mood and Affect: Mood normal.         Behavior: Behavior normal.         No notes on file    Labs:  Recent Results (from the past 72 hour(s))   Urinalysis-UC if Indicated   Result Value Ref Range    Color,  "UA Yellow Colorless, Yellow, Straw, Light Yellow    Clarity, UA Clear Clear    Glucose, UA Negative Negative    Bilirubin, UA Negative Negative    Ketones, UA Trace (!) Negative    Specific Gravity, UA >=1.030 1.005 - 1.030    Blood, UA Trace (!) Negative    pH, UA 6.0 5.0 - 8.0    Protein, UA Negative Negative mg/dL    Urobilinogen, UA 0.2 E.U./dL 0.2 E.U./dL, 1.0 E.U./dL    Nitrite, UA Positive (!) Negative    Leukocytes, UA Negative Negative    Bacteria, UA Many (!) None Seen hpf    RBC, UA 0-2 None Seen, 0-2 hpf    WBC, UA 5-10 (!) None Seen, 0-5 hpf    Squam Epithel, UA 0-5 None Seen, 0-5 lpf       Radiology:None obtained    Clinical Decision Making: At the end of the encounter, I discussed results, diagnosis, medications. Discussed UTI and need for treatment and increased water intake. Reviewed indications for follow up if no improvement. Patient understood and agreed to plan.     YOVANY Ryan, CNP       1. Acute cystitis without hematuria  sulfamethoxazole-trimethoprim (SEPTRA DS) 800-160 mg per tablet   2. Dysuria  Urinalysis-UC if Indicated    Culture, Urine         Patient Instructions     Patient Education     Bladder Infection, Female (Adult)    Urine is normally doesn't have any bacteria in it. But bacteria can get into the urinary tract from the skin around the rectum. Or they can travel in the blood from elsewhere in the body. Once they are in your urinary tract, they can cause infection in the urethra (urethritis), the bladder (cystitis), or the kidneys (pyelonephritis).  The most common place for an infection is in the bladder. This is called a bladder infection. This is one of the most common infections in women. Most bladder infections are easily treated. They are not serious unless the infection spreads to the kidney.  The phrases \"bladder infection,\" \"UTI,\" and \"cystitis\" are often used to describe the same thing. But they are not always the same. Cystitis is an inflammation of the " bladder. The most common cause of cystitis is an infection.  Symptoms  The infection causes inflammation in the urethra and bladder. This causes many of the symptoms. The most common symptoms of a bladder infection are:    Pain or burning when urinating    Having to urinate more often than usual    Urgent need to urinate    Only a small amount of urine comes out    Blood in urine    Abdominal discomfort. This is usually in the lower abdomen above the pubic bone.    Cloudy urine    Strong- or bad-smelling urine    Unable to urinate (urinary retention)    Unable to hold urine in (urinary incontinence)    Fever    Loss of appetite    Confusion (in older adults)  Causes  Bladder infections are not contagious. You can't get one from someone else, from a toilet seat, or from sharing a bath.  The most common cause of bladder infections is bacteria from the bowels. The bacteria get onto the skin around the opening of the urethra. From there, they can get into the urine and travel up to the bladder, causing inflammation and infection. This usually happens because of:    Wiping improperly after urinating. Always wipe from front to back.    Bowel incontinence    Pregnancy    Procedures such as having a catheter inserted    Older age    Not emptying your bladder. This can allow bacteria a chance to grow in your urine.    Dehydration    Constipation    Sex    Use of a diaphragm for birth control   Treatment  Bladder infections are diagnosed by a urine test. They are treated with antibiotics and usually clear up quickly without complications. Treatment helps prevent a more serious kidney infection.  Medicines  Medicines can help in the treatment of a bladder infection:    Take antibiotics until they are used up, even if you feel better. It is important to finish them to make sure the infection has cleared.    You can use acetaminophen or ibuprofen for pain, fever, or discomfort, unless another medicine was prescribed. If you have  chronic liver or kidney disease, talk with your healthcare provider before using these medicines. Also talk with your provider if you've ever had a stomach ulcer or gastrointestinal bleeding, or are taking blood-thinner medicines.    If you are given phenazopydridine to reduce burning with urination, it will cause your urine to become a bright orange color. This can stain clothing.  Care and prevention  These self-care steps can help prevent future infections:    Drink plenty of fluids to prevent dehydration and flush out your bladder. Do this unless you must restrict fluids for other health reasons, or your doctor told you not to.    Proper cleaning after going to the bathroom is important. Wipe from front to back after using the toilet to prevent the spread of bacteria.    Urinate more often. Don't try to hold urine in for a long time.    Wear loose-fitting clothes and cotton underwear. Avoid tight-fitting pants.    Improve your diet and prevent constipation. Eat more fresh fruit and vegetables, and fiber, and less junk and fatty foods.    Avoid sex until your symptoms are gone.    Avoid caffeine, alcohol, and spicy foods. These can irritate your bladder.    Urinate right after intercourse to flush out your bladder.    If you use birth control pills and have frequent bladder infections, discuss it with your doctor.  Follow-up care  Call your healthcare provider if all symptoms are not gone after 3 days of treatment. This is especially important if you have repeat infections.  If a culture was done, you will be told if your treatment needs to be changed. If directed, you can call to find out the results.  If X-rays were done, you will be told if the results will affect your treatment.  Call 911  Call 911 if any of the following occur:    Trouble breathing    Hard to wake up or confusion    Fainting or loss of consciousness    Rapid heart rate  When to seek medical advice  Call your healthcare provider right away if  any of these occur:    Fever of 100.4 F (38.0 C) or higher, or as directed by your healthcare provider    Symptoms are not better by the third day of treatment    Back or belly (abdominal) pain that gets worse    Repeated vomiting, or unable to keep medicine down    Weakness or dizziness    Vaginal discharge    Pain, redness, or swelling in the outer vaginal area (labia)  Date Last Reviewed: 10/1/2016    8457-2660 [employer]. [address] [place] [address]. All rights reserved. This information is not intended as a substitute for professional medical care. Always follow your healthcare professional's instructions.
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
## 2021-06-29 NOTE — PROGRESS NOTES
Progress Notes by Ileana Garner CNP at 4/28/2020  3:30 PM     Author: Ileana Garner CNP Service: -- Author Type: Nurse Practitioner    Filed: 4/28/2020  5:43 PM Encounter Date: 4/28/2020 Status: Signed    : Ileana Garner CNP (Nurse Practitioner)       Chief Complaint   Patient presents with   ? POSSIBLE CYST     SEEN ON 4/26. POSSIBLE CYST VAGINAL AREA-- HAS GOTTEN WORSE, STRESSED ABOUT THIS.    ? Hand Pain     BOTH HANDS        ASSESSMENT & PLAN:   Diagnoses and all orders for this visit:    Trichomonas infection  -     metroNIDAZOLE (FLAGYL) 500 MG tablet; Take 4 tablets (2,000 mg total) by mouth once for 1 dose.  Dispense: 4 tablet; Refill: 0    Vaginal itching  -     Wet Prep, Vaginal  -     hydrOXYzine pamoate (VISTARIL) 25 MG capsule; Take 1 capsule (25 mg total) by mouth every 6 (six) hours as needed for itching or anxiety.  Dispense: 30 capsule; Refill: 0    Rash  -     hydrOXYzine pamoate (VISTARIL) 25 MG capsule; Take 1 capsule (25 mg total) by mouth every 6 (six) hours as needed for itching or anxiety.  Dispense: 30 capsule; Refill: 0    Atopy  -     hydrOXYzine pamoate (VISTARIL) 25 MG capsule; Take 1 capsule (25 mg total) by mouth every 6 (six) hours as needed for itching or anxiety.  Dispense: 30 capsule; Refill: 0    Screen for STD (sexually transmitted disease)    STD exposure  -     cefTRIAXone injection 250 mg (ROCEPHIN)  -     Chlamydia trachomatis & Neisseria gonorrhoeae, Amplified Detection  -     Syphilis Screen, [facility]  -     HIV Antigen/Antibody Screening [facility]  -     azithromycin tablet 1,000 mg (ZITHROMAX)    Anxiety    Bipolar Disorder    Alcohol abuse        MDM:  Rash differential includes scabies, atopic dermatitis including scabs and open areas due to itching, bedbugs, allergic dermatitis.  Rash is pretty diffuse on face, hands, extremities.  All rash areas show evidence of scratching/picking and may simply all be from scratching and picking. No classic scabies  "signs such as worsening rash in the intertriginous areas nor doing fingers, but mentioned this to patient if she is not getting better to get rechecked.  Would have considered empirically treating her for scabies today, but her emotional state does not allow me to treat additional complaints at this time.      Vaginal cyst opened by itself - no treatment needed. Warm packs for 1-2 days.     Patient is extremely anxious today and is not on any bipolar medications as per her choice for the last year.  Offered Vistaril for itching and anxiety in addition to previous treatments.      Patient shows me area of self removed skin to left thumb.  This was removed per patient including while I was sitting there and there is nothing \"growing\" out of her hand.  Discharge instructions recommended therapy for stress and anxiety, but patient not receptive to this.    Patient stormed out of the clinic prior to trichomonas results coming back because she was unhappy that I did not agree that the thumb issue was evidence that something was growing from her skin.  Patient yelled at me on the phone and required 3 separate phone calls for her to return for STD testing and treatment.  Patient did return with security present and was treated for gonorrhea, chlamydia.  Patient verifies she has not had anything to drink in 1 year, but did remind her not to take any alcohol for 3 days following one-time dose of Flagyl.    Normally would treat with 7-day course of Flagyl, but due to extreme anxiety, do not know if she will follow through.    Patient advised to be rechecked for trichomonas in 7 days prior to any sexual intercourse.    Supportive care discussed.  See discharge instructions below for specific recommendations given.    At the end of the encounter, I discussed results, diagnosis, medications. Discussed red flags for immediate return to clinic/ER, as well as indications for follow up if no improvement. Patient and/or caregiver " "understood and agreed to plan. Patient was stable for discharge.    SUBJECTIVE    HPI:  HPI  Shobha Hairston presents to the walk-in clinic with   Chief Complaint   Patient presents with   ? POSSIBLE CYST     SEEN ON 4/26. POSSIBLE CYST VAGINAL AREA-- HAS GOTTEN WORSE, STRESSED ABOUT THIS.    ? Hand Pain     BOTH HANDS      Patient with a history of eczema has had pruritus and rash to legs, face, feet with possible secondary infection due to scratching - cephalexin prescribed.  Was seen for this on the 26th.  Recommended Eucerin or similar, hydrocortisone to especially itchy areas.  See this visit for further information.    Today is complaining of same with new vaginal area itching and possible cyst in vaginal area.      Was cheated on by miguel.  Says this rash is similar to previous rashes when she is \"stressed out\" with the exception of rash on face.    Not on bipolar medications.  Thinks something is \"growing out of [her] thumb.\"     No alcohol x 1 year.      Lives with daughter and her mother and they do not have any rash.    See ROS for additional symptoms and/or pertinent negatives.       History obtained from the patient.    Past Medical History:   Diagnosis Date   ? Abnormal Pap Smear Of Cervix     Created by Conversion    ? Alcohol abuse     Created by Conversion  Replacement Utility updated for latest IMO load   ? Allergic rhinitis     Created by Conversion  Replacement Utility updated for latest IMO load   ? Attention-deficit Hyperactivity Disorder     Created by Conversion    ? Bipolar Disorder     Created by Conversion  Replacement Utility updated for latest IMO load   ? Mild intermittent asthma without complication     Created by Conversion    ? Nephrolithiasis     Created by Conversion [facility] Annotation: Sep 28 2007  4:38PM - Vanessa Garner: '02, '03    ? Overweight (BMI 25.0-29.9)     Created by Conversion    ? Pyelonephritis     Created by Conversion  Replacement Utility updated for latest " "IMO load   ? Tobacco abuse 12/21/2015       Active Ambulatory (Non-Hospital) Problems    Diagnosis   ? Tobacco abuse   ? Allergic Rhinitis   ? Attention deficit hyperactivity disorder (ADHD), combined type   ? Nephrolithiasis   ? Abnormal Pap Smear Of Cervix   ? Mild intermittent asthma without complication   ? H/O pyelonephritis   ? Ovarian Cyst   ? Bipolar Disorder   ? Alcohol Abuse       Family History   Problem Relation Age of Onset   ? Thyroid disease Mother    ? Diabetes type II Mother    ? Hypertension Mother        Social History     Tobacco Use   ? Smoking status: Current Every Day Smoker     Types: Cigarettes   ? Smokeless tobacco: Never Used   ? Tobacco comment: 1 pack per day   Substance Use Topics   ? Alcohol use: Not on file       Review of Systems   Constitutional: Negative for chills and fever.   HENT: Positive for mouth sores.    Genitourinary: Negative for dysuria and vaginal discharge.   Skin: Positive for rash and wound.   Psychiatric/Behavioral: Positive for agitation, decreased concentration and sleep disturbance. The patient is nervous/anxious.    All other systems reviewed and are negative.      OBJECTIVE    Vitals:    04/28/20 1456   BP: 131/80   Patient Site: Left Arm   Patient Position: Sitting   Cuff Size: Adult Regular   Pulse: (!) 117   Resp: 18   SpO2: 96%   Weight: 122 lb (55.3 kg)   Height: 5' 3\" (1.6 m)       Physical Exam  Constitutional:       Appearance: She is well-developed.   HENT:      Nose: No congestion or rhinorrhea.   Eyes:      Conjunctiva/sclera: Conjunctivae normal.   Neck:      Musculoskeletal: Normal range of motion.   Pulmonary:      Effort: Pulmonary effort is normal.   Genitourinary:     Comments: Open area on rt lower labia with evidence of previous drainage.  Non-raised, no pus expressed.  No s/s infection.    Musculoskeletal:      Comments: Normal gait    Skin:     General: Skin is warm and dry.      Findings: Rash (multiple small scabs on extremities, face. " "Left thumb dry and with light pink erythema and picked off skin along nail edge) present.   Neurological:      Mental Status: She is alert and oriented to person, place, and time.   Psychiatric:         Mood and Affect: Mood normal.         Behavior: Behavior normal.         Thought Content: Thought content normal.         Judgment: Judgment normal.         Labs:  Recent Results (from the past 240 hour(s))   Wet Prep, Vaginal    Specimen: Genital   Result Value Ref Range    Yeast Result No yeast seen No yeast seen    Trichomonas Trichomonas seen (!) No Trichomonas seen    Clue Cells, Wet Prep No Clue cells seen No Clue cells seen         Radiology:    No results found.    PATIENT INSTRUCTIONS:   Patient Instructions   Hydroxyzine for anxiety and itching as needed    Call therapist related to severe anxiety and stress with relationship to discuss and help.     Lukewarm baths and showers.     Warm packs to vaginal cyst that has drained for 20 minutes for the next 2-3 days.      Continue cephalexin.      Try aster \"headspace\" for mindfulness and meditation/stress.  Some of it is free due to covid-19.      ---    STDs: Flagyl is at the pharmacy for trichomonas. Take all of your metronidazole pills at once.  No drinking for 3 days following this - you will get very sick if alcohol is mixed with this medication.     You were treated for gonorrhea and chlamydia today.  No need for further treatment if you test positive for one of these.     No sexual intercourse until retested for trichomonas in about a week - this can be done at your clinic, [facility], etc.   This one time dose only works about 8/10 times.      Patient Education       Patient Education     Trichomonas Vaginal Infection (Trichomoniasis)    You have a Trichomonas vaginal infection. This problem is often called trich. It is caused by a parasite that is passed during sex. This makes trich a sexually transmitted disease (STD). Both men and women can get " trich, but it is more common in women.  Most people who have trich dont have any symptoms at first. If symptoms do occur, they may take weeks or months to develop.  Symptoms in women can include:    Thin discharge from the vagina that may smell bad and be clear, white, gray, green, or yellow in color    Itching, burning, redness, or soreness in or around the vagina    Pain in the lower belly    Frequent urination or pain and burning during urination    Pain during sex  Symptoms in men are not very common. Men may have trich and pass it to women during sex without knowing they were ever infected.  Trich is most often treated with antibiotics. Without treatment, trich can increase the risk of more serious health problems such as:    Pelvic inflammatory disease (PID)     delivery (giving birth to a baby early if youre pregnant)    HIV and certain other sexually transmitted diseases (STDs)  Home care    Take the antibiotics youre prescribed exactly as directed. Finish all of the medicine, even if your symptoms go away.    Avoid drinking alcohol until youre done with your treatment.    Tell any partners you have sex with that you have trich. They will need be tested for trich and possibly treated as well.    Avoid having sex until you and any partners you have sex with are confirmed to no longer have trich.  Prevention  The only way to avoid getting trich or any other STD is to avoid having sex. If you choose to have sex, then take steps to lower your health risks:    Use condoms when having sex.    Limit the number of partners you have sex with.    Get tested regularly for STDs. Ask any partner you have sex with to do the same.    Dont have sex with anyone who has symptoms that may be due to an STD.  Follow-up care  Follow up with your healthcare provider, or as advised. Testing will likely be done to ensure that the infection has cleared.  When to seek medical advice  Call your healthcare provider right away  if:    You have a fever of 100.4 F (38 C) or higher, or as directed by your provider.    Your symptoms worsen, or they dont go away even after completing your treatment.    You have new pain in the lower belly or pelvic region.    You have side effects that bother you or a reaction to the medicine youre taking.    You or any partners you have sex with have new symptoms, such as a rash, joint pain, or sores.  Date Last Reviewed: 10/1/2017    9663-5455 [employer]. [address] [place], [place] [id number]. All rights reserved. This information is not intended as a substitute for professional medical care. Always follow your healthcare professional's instructions.

## 2021-07-03 NOTE — ADDENDUM NOTE
Addendum Note by Betsy Green MD at 6/5/2017  6:36 PM     Author: Betsy Green MD Service: -- Author Type: Physician    Filed: 6/5/2017  6:36 PM Encounter Date: 6/5/2017 Status: Signed    : Betsy Green MD (Physician)    Addended by: BETSY GREEN on: 6/5/2017 06:36 PM        Modules accepted: Orders

## 2021-07-04 NOTE — PROGRESS NOTES
"Progress Notes by MEAGHAN LINDSAY at 6/21/2021  2:30 PM     Author: MEAGHAN LINDSAY Service: -- Author Type: Medical Student    Filed: 6/21/2021  7:33 PM Encounter Date: 6/21/2021 Status: Attested    : MEAGHAN LINDSAY (Medical Student)    Related Notes: Original Note by MEAGHAN LINDSAY (Medical Student) filed at 6/21/2021  7:33 PM    Cosigner: Reinaldo Treadwell CNP at 6/21/2021  7:39 PM    Attestation signed by Reinaldo Treadwell CNP at 6/21/2021  7:39 PM    Preceptor attestation:   Patient was seen in conjunction with Meaghan Lindsay RN, student NP. Joint medical decision making was used.    I discussed the patient's history and physical exam with the student and key elements of the physical exam were performed by myself. I agree with their assessment and plan and above documentation.       YOVANY Ryan CNP                   Chief Complaint   Patient presents with   ? Skin Problem     X6 months and getting worse, believes she has athletes foot on her face   ? Nail Problem     X2 years, believes she has athletes foot in her finger nails       HPI:  Shobha Hairston is a 41 y.o. female who presents today complaining of fungal infection in her face, hands and feet. Rash in her face started about 4-6 months ago, started as a dry patch, but had the athletes foot for awhile. Patient reports the rash started it on her feet bilaterally and spread to her face and hands. Patient reports her rash gets worse when she is on her period. Patient reports she is in a lot of pain in her hands, feels like \"glass is pushing through my skin\" some days it is 10/10 and now it is 8/10. Does not take anything for pain. Patient reports she sprays OTC ant-fungal spray to both hands alleviates   pain. Patient denies chills and fever.         History obtained from the patient.    Problem List:  2015-12: Tobacco abuse  Attention deficit hyperactivity disorder (ADHD), combined type  Nephrolithiasis  Abnormal Pap Smear Of " Cervix  Overweight (BMI 25.0-29.9)  Menorrhagia  Mild intermittent asthma without complication  Pain During Urination (Dysuria)  H/O pyelonephritis  Ovarian Cyst  Bipolar Disorder  Alcohol Abuse  Depression With Anxiety  Urinary Tract Infection  Ankle Injury  Allergic Rhinitis  Intertrigo  Asthma With Acute Exacerbation  Amenorrhea      Past Medical History:   Diagnosis Date   ? Abnormal Pap Smear Of Cervix     Created by Conversion    ? Alcohol abuse     Created by Conversion  Replacement Utility updated for latest IMO load   ? Allergic rhinitis     Created by Conversion  Replacement Utility updated for latest IMO load   ? Attention-deficit Hyperactivity Disorder     Created by Conversion    ? Bipolar Disorder     Created by Conversion  Replacement Utility updated for latest IMO load   ? Mild intermittent asthma without complication     Created by Conversion    ? Nephrolithiasis     Created by Conversion [facility] Annotation: Sep 28 2007  4:38PM - Vanessa Garner: '02, '03    ? Overweight (BMI 25.0-29.9)     Created by Conversion    ? Pyelonephritis     Created by Conversion  Replacement Utility updated for latest IMO load   ? Tobacco abuse 12/21/2015       Social History     Tobacco Use   ? Smoking status: Current Every Day Smoker     Types: Cigarettes   ? Smokeless tobacco: Never Used   ? Tobacco comment: 1 pack per day   Substance Use Topics   ? Alcohol use: Not on file       Review of Systems   Constitutional: Negative for chills, fatigue and fever.   Respiratory: Negative for cough and shortness of breath.    Cardiovascular: Negative for leg swelling.   Gastrointestinal: Negative for abdominal distention, abdominal pain, constipation, diarrhea and vomiting.   Endocrine: Negative for polyuria.   Genitourinary: Positive for flank pain. Negative for vaginal bleeding and vaginal discharge.        Patient reports foully smelling urine     Skin: Positive for rash.   Neurological: Positive for weakness. Negative for  dizziness and headaches.       Vitals:    06/21/21 1424   BP: 95/66   Patient Site: Right Arm   Patient Position: Sitting   Cuff Size: Adult Small   Pulse: 98   Temp: 99.1  F (37.3  C)   TempSrc: Oral   SpO2: 97%   Weight: 106 lb (48.1 kg)       Physical Exam    No notes on file    Labs:  Recent Results (from the past 72 hour(s))   Urinalysis-UC if Indicated   Result Value Ref Range    Color, UA Yellow Colorless, Yellow, Straw, Light Yellow    Clarity, UA Cloudy (!) Clear    Glucose, UA Negative Negative    Protein, UA Negative Negative    Bilirubin, UA Negative Negative    Urobilinogen, UA 1.0 E.U./dL 0.2 E.U./dL, 1.0 E.U./dL    pH, UA 6.0 5.0 - 8.0    Blood, UA Moderate (!) Negative    Ketones, UA Negative Negative    Nitrite, UA Negative Negative    Leukocytes, UA Small (!) Negative    Specific Gravity, UA >=1.030 1.005 - 1.030    RBC, UA 0-2 None Seen, 0-2 hpf    WBC, UA 10-25 (!) None Seen, 0-5 hpf    Bacteria, UA Many (!) None Seen    Mucus, UA Moderate (!) None Seen lpf       Radiology: None obtained     No results found.    Clinical Decision Making:    At the end of the encounter, I discussed results, diagnosis, medications. Discussed with patient needing further evaluation for skin rash with dermatologist, referral placed. Will treat Impetigo with sulfamethoxazole-trimethoprim for 7 days. Encouraged patient to increase water hydration, close monitoring of the skin. Reviewed red flags for immediate return to clinic/ER, as well as indications for follow up of no improvement. Patient understood and agreed to plan.       1. Impetigo  sulfamethoxazole-trimethoprim (BACTRIM DS) 800-160 mg per tablet    mupirocin (BACTROBAN) 2 % cream   2. Acute cystitis without hematuria  Urinalysis-UC if Indicated    Culture, Urine   3. Mild intermittent asthma with exacerbation  albuterol (PROAIR HFA;PROVENTIL HFA;VENTOLIN HFA) 90 mcg/actuation inhaler   4. Tinea corporis  Ambulatory referral to Dermatology [facility]at[facility],  [place]

## 2021-07-06 VITALS
HEART RATE: 98 BPM | BODY MASS INDEX: 18.78 KG/M2 | OXYGEN SATURATION: 97 % | DIASTOLIC BLOOD PRESSURE: 66 MMHG | TEMPERATURE: 99.1 F | SYSTOLIC BLOOD PRESSURE: 95 MMHG | WEIGHT: 106 LBS

## 2021-09-18 ENCOUNTER — HEALTH MAINTENANCE LETTER (OUTPATIENT)
Age: 41
End: 2021-09-18

## 2021-12-29 ENCOUNTER — OFFICE VISIT (OUTPATIENT)
Dept: FAMILY MEDICINE | Facility: CLINIC | Age: 41
End: 2021-12-29
Payer: OTHER GOVERNMENT

## 2021-12-29 VITALS
SYSTOLIC BLOOD PRESSURE: 118 MMHG | OXYGEN SATURATION: 100 % | DIASTOLIC BLOOD PRESSURE: 60 MMHG | BODY MASS INDEX: 18.95 KG/M2 | HEIGHT: 64 IN | RESPIRATION RATE: 16 BRPM | HEART RATE: 109 BPM | WEIGHT: 111 LBS

## 2021-12-29 DIAGNOSIS — Z23 HIGH PRIORITY FOR 2019-NCOV VACCINE: ICD-10-CM

## 2021-12-29 DIAGNOSIS — R35.0 URINARY FREQUENCY: Primary | ICD-10-CM

## 2021-12-29 DIAGNOSIS — J45.20 MILD INTERMITTENT ASTHMA WITHOUT COMPLICATION: ICD-10-CM

## 2021-12-29 DIAGNOSIS — B36.9 FUNGAL SKIN INFECTION: ICD-10-CM

## 2021-12-29 DIAGNOSIS — Z23 NEED FOR PROPHYLACTIC VACCINATION AND INOCULATION AGAINST INFLUENZA: ICD-10-CM

## 2021-12-29 LAB
ALBUMIN UR-MCNC: NEGATIVE MG/DL
APPEARANCE UR: CLEAR
BILIRUB UR QL STRIP: NEGATIVE
COLOR UR AUTO: YELLOW
GLUCOSE UR STRIP-MCNC: NEGATIVE MG/DL
HGB UR QL STRIP: NEGATIVE
KETONES UR STRIP-MCNC: NEGATIVE MG/DL
LEUKOCYTE ESTERASE UR QL STRIP: NEGATIVE
NITRATE UR QL: NEGATIVE
PH UR STRIP: 5.5 [PH] (ref 5–7)
SP GR UR STRIP: >=1.03 (ref 1–1.03)
UROBILINOGEN UR STRIP-ACNC: 0.2 E.U./DL

## 2021-12-29 PROCEDURE — 0002A COVID-19,PF,PFIZER (12+ YRS): CPT | Performed by: NURSE PRACTITIONER

## 2021-12-29 PROCEDURE — 91300 COVID-19,PF,PFIZER (12+ YRS): CPT | Performed by: NURSE PRACTITIONER

## 2021-12-29 PROCEDURE — 90686 IIV4 VACC NO PRSV 0.5 ML IM: CPT | Performed by: NURSE PRACTITIONER

## 2021-12-29 PROCEDURE — 99213 OFFICE O/P EST LOW 20 MIN: CPT | Mod: 25 | Performed by: NURSE PRACTITIONER

## 2021-12-29 PROCEDURE — 81003 URINALYSIS AUTO W/O SCOPE: CPT | Performed by: NURSE PRACTITIONER

## 2021-12-29 PROCEDURE — 90471 IMMUNIZATION ADMIN: CPT | Performed by: NURSE PRACTITIONER

## 2021-12-29 PROCEDURE — 87086 URINE CULTURE/COLONY COUNT: CPT | Performed by: NURSE PRACTITIONER

## 2021-12-29 RX ORDER — ECONAZOLE NITRATE 10 MG/G
CREAM TOPICAL DAILY
Qty: 85 G | Refills: 1 | Status: SHIPPED | OUTPATIENT
Start: 2021-12-29 | End: 2023-06-26

## 2021-12-29 RX ORDER — ALBUTEROL SULFATE 90 UG/1
2 AEROSOL, METERED RESPIRATORY (INHALATION) EVERY 6 HOURS PRN
Qty: 18 G | Refills: 0 | Status: SHIPPED | OUTPATIENT
Start: 2021-12-29 | End: 2023-07-28

## 2021-12-29 ASSESSMENT — MIFFLIN-ST. JEOR: SCORE: 1163.49

## 2021-12-29 NOTE — PROGRESS NOTES
Assessment & Plan     Urinary frequency  Negative UA. Reassurance provided. She does notes symptoms have vastly improved since yesterday.  - UA macro with reflex to Microscopic and Culture - Clinc Collect    Mild intermittent asthma without complication  Refilled inhaler.  - albuterol (PROAIR HFA/PROVENTIL HFA/VENTOLIN HFA) 108 (90 Base) MCG/ACT inhaler; Inhale 2 puffs into the lungs every 6 hours as needed for wheezing    Fungal skin infection  I am not entirely certain this is fungal, however she is adamant that it is and would like to try antifungal. Appears to me to be related to skin picking. Skin hygiene encouraged.  - econazole nitrate 1 % external cream; Apply topically daily    High priority for 2019-nCoV vaccine    - COVID-19,PF,PFIZER (12+ Yrs PURPLE LABEL)    Need for prophylactic vaccination and inoculation against influenza    - INFLUENZA VACCINE IM > 6 MONTHS VALENT IIV4 (AFLURIA/FLUZONE)         Tobacco Cessation:   reports that she has been smoking cigarettes. She has been smoking about 0.15 packs per day. She has never used smokeless tobacco.      Patient Instructions   Use the topical antifungal daily.  Your albuterol is refilled.      Return in about 1 month (around 1/29/2022) for worsening or continued symptoms.    YOVANY [employer] is a 41 year old who presents for the following health issues  accompanied by her self .    HPI     Chief Complaint   Patient presents with     Urinary Problem     Asthma     recheck and refill      Derm Problem     fungus on hands and toes        Genitourinary - Female  Onset/Duration: 4 days   Description:   Painful urination (Dysuria): no           Frequency: YES  Blood in urine (Hematuria): no  Delay in urine (Hesitency): no  Intensity: moderate, severe  Progression of Symptoms:  worsening  Accompanying Signs & Symptoms:  Fever/chills: YES  Flank pain: YES  Nausea and vomiting:  "YES  Vaginal symptoms: none  Abdominal/Pelvic Pain: YES  History:   History of frequent UTI s:yes  History of kidney stones: YES  Sexually Active: YES  Possibility of pregnancy: No  Precipitating or alleviating factors: None  Therapies tried and outcome: Increase fluid intake and OTC advil or tylenol     Asthma Follow-Up    Was ACT completed today?    Yes    ACT Total Scores 12/29/2021   ACT TOTAL SCORE (Goal Greater than or Equal to 20) 13   In the past 12 months, how many times did you visit the emergency room for your asthma without being admitted to the hospital? 0   In the past 12 months, how many times were you hospitalized overnight because of your asthma? 0         How many days per week do you miss taking your asthma controller medication?  I do not have an asthma controller medication    Please describe any recent triggers for your asthma: stress and qitting smoking     Have you had any Emergency Room Visits, Urgent Care Visits, or Hospital Admissions since your last office visit?  No      How many servings of fruits and vegetables do you eat daily?  0-1    On average, how many sweetened beverages do you drink each day (Examples: soda, juice, sweet tea, etc.  Do NOT count diet or artificially sweetened beverages)?   0    How many days per week do you exercise enough to make your heart beat faster? 3 or less    How many minutes a day do you exercise enough to make your heart beat faster? 9 or less    How many days per week do you miss taking your medication? 0      Review of Systems   Constitutional, HEENT, cardiovascular, pulmonary, gi and gu systems are negative, except as otherwise noted.      Objective    /60   Pulse 109   Resp 16   Ht 1.626 m (5' 4\")   Wt 50.3 kg (111 lb)   SpO2 100%   BMI 19.05 kg/m    Body mass index is 19.05 kg/m .  Physical Exam  Vitals and nursing note reviewed.   Constitutional:       General: She is not in acute distress.     Appearance: Normal appearance.   HENT:    "   Head: Normocephalic and atraumatic.      Mouth/Throat:      Mouth: Mucous membranes are moist.   Cardiovascular:      Rate and Rhythm: Normal rate.   Pulmonary:      Effort: Pulmonary effort is normal.   Abdominal:      Tenderness: There is no right CVA tenderness or left CVA tenderness.   Musculoskeletal:      Cervical back: Neck supple.   Skin:     General: Skin is warm and dry.      Comments: Excoriated patched to bilateral palms, nail beds and distal finger tips. Scaling of both hands, overall dry skin condition.   Neurological:      General: No focal deficit present.      Mental Status: She is alert.   Psychiatric:         Mood and Affect: Mood normal.         Behavior: Behavior normal.

## 2021-12-30 ASSESSMENT — ASTHMA QUESTIONNAIRES: ACT_TOTALSCORE: 13

## 2021-12-31 LAB — BACTERIA UR CULT: NO GROWTH

## 2022-06-25 ENCOUNTER — HEALTH MAINTENANCE LETTER (OUTPATIENT)
Age: 42
End: 2022-06-25

## 2022-07-26 ENCOUNTER — HOSPITAL ENCOUNTER (EMERGENCY)
Facility: CLINIC | Age: 42
Discharge: HOME | End: 2022-07-26
Attending: STUDENT IN AN ORGANIZED HEALTH CARE EDUCATION/TRAINING PROGRAM | Admitting: STUDENT IN AN ORGANIZED HEALTH CARE EDUCATION/TRAINING PROGRAM

## 2022-07-26 ENCOUNTER — APPOINTMENT (OUTPATIENT)
Dept: RADIOLOGY | Facility: CLINIC | Age: 42
End: 2022-07-26
Attending: STUDENT IN AN ORGANIZED HEALTH CARE EDUCATION/TRAINING PROGRAM

## 2022-07-26 VITALS
TEMPERATURE: 98.5 F | HEIGHT: 64 IN | HEART RATE: 57 BPM | WEIGHT: 120 LBS | RESPIRATION RATE: 18 BRPM | OXYGEN SATURATION: 99 % | DIASTOLIC BLOOD PRESSURE: 55 MMHG | SYSTOLIC BLOOD PRESSURE: 109 MMHG | BODY MASS INDEX: 20.49 KG/M2
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
## 2022-07-26 LAB
ANION GAP SERPL CALCULATED.3IONS-SCNC: 7 MMOL/L (ref 5–18)
BASOPHILS # BLD MANUAL: 0 10E3/UL (ref 0–0.2)
BASOPHILS NFR BLD MANUAL: 0 %
BUN SERPL-MCNC: 10 MG/DL (ref 8–22)
C REACTIVE PROTEIN LHE: 0.4 MG/DL (ref 0–?)
CALCIUM SERPL-MCNC: 9.4 MG/DL (ref 8.5–10.5)
CHLORIDE BLD-SCNC: 107 MMOL/L (ref 98–107)
CO2 SERPL-SCNC: 26 MMOL/L (ref 22–31)
CREAT SERPL-MCNC: 0.69 MG/DL (ref 0.6–1.1)
EOSINOPHIL # BLD MANUAL: 0 10E3/UL (ref 0–0.7)
EOSINOPHIL NFR BLD MANUAL: 0 %
ERYTHROCYTE [DISTWIDTH] IN BLOOD BY AUTOMATED COUNT: 12.8 % (ref 10–15)
ERYTHROCYTE [SEDIMENTATION RATE] IN BLOOD BY WESTERGREN METHOD: 13 MM/HR (ref 0–20)
GFR SERPL CREATININE-BSD FRML MDRD: >90 ML/MIN/1.73M2
GLUCOSE BLD-MCNC: 87 MG/DL (ref 70–125)
HCT VFR BLD AUTO: 34.4 % (ref 35–47)
HGB BLD-MCNC: 11.7 G/DL (ref 11.7–15.7)
LYMPHOCYTES # BLD MANUAL: 2.9 10E3/UL (ref 0.8–5.3)
LYMPHOCYTES NFR BLD MANUAL: 26 %
MCH RBC QN AUTO: 31.1 PG (ref 26.5–33)
MCHC RBC AUTO-ENTMCNC: 34 G/DL (ref 31.5–36.5)
MCV RBC AUTO: 92 FL (ref 78–100)
MONOCYTES # BLD MANUAL: 1 10E3/UL (ref 0–1.3)
MONOCYTES NFR BLD MANUAL: 9 %
NEUTROPHILS # BLD MANUAL: 7.2 10E3/UL (ref 1.6–8.3)
NEUTROPHILS NFR BLD MANUAL: 65 %
PLAT MORPH BLD: NORMAL
PLATELET # BLD AUTO: 271 10E3/UL (ref 150–450)
POTASSIUM BLD-SCNC: 4.4 MMOL/L (ref 3.5–5)
RBC # BLD AUTO: 3.76 10E6/UL (ref 3.8–5.2)
RBC MORPH BLD: NORMAL
SODIUM SERPL-SCNC: 140 MMOL/L (ref 136–145)
WBC # BLD AUTO: 11 10E3/UL (ref 4–11)

## 2022-07-26 PROCEDURE — 99284 EMERGENCY DEPT VISIT MOD MDM: CPT | Mod: 25

## 2022-07-26 PROCEDURE — 85007 BL SMEAR W/DIFF WBC COUNT: CPT | Performed by: STUDENT IN AN ORGANIZED HEALTH CARE EDUCATION/TRAINING PROGRAM

## 2022-07-26 PROCEDURE — 86140 C-REACTIVE PROTEIN: CPT | Performed by: STUDENT IN AN ORGANIZED HEALTH CARE EDUCATION/TRAINING PROGRAM

## 2022-07-26 PROCEDURE — 82310 ASSAY OF CALCIUM: CPT | Performed by: STUDENT IN AN ORGANIZED HEALTH CARE EDUCATION/TRAINING PROGRAM

## 2022-07-26 PROCEDURE — 73140 X-RAY EXAM OF FINGER(S): CPT | Mod: LT

## 2022-07-26 PROCEDURE — 85014 HEMATOCRIT: CPT | Performed by: STUDENT IN AN ORGANIZED HEALTH CARE EDUCATION/TRAINING PROGRAM

## 2022-07-26 PROCEDURE — 250N000011 HC RX IP 250 OP 636: Performed by: STUDENT IN AN ORGANIZED HEALTH CARE EDUCATION/TRAINING PROGRAM

## 2022-07-26 PROCEDURE — 85652 RBC SED RATE AUTOMATED: CPT | Performed by: STUDENT IN AN ORGANIZED HEALTH CARE EDUCATION/TRAINING PROGRAM

## 2022-07-26 PROCEDURE — 36415 COLL VENOUS BLD VENIPUNCTURE: CPT | Performed by: STUDENT IN AN ORGANIZED HEALTH CARE EDUCATION/TRAINING PROGRAM

## 2022-07-26 PROCEDURE — 96374 THER/PROPH/DIAG INJ IV PUSH: CPT

## 2022-07-26 RX ORDER — PIPERACILLIN SODIUM, TAZOBACTAM SODIUM 3; .375 G/15ML; G/15ML
3.38 INJECTION, POWDER, LYOPHILIZED, FOR SOLUTION INTRAVENOUS ONCE
Status: COMPLETED | OUTPATIENT
Start: 2022-07-26 | End: 2022-07-26

## 2022-07-26 RX ADMIN — PIPERACILLIN AND TAZOBACTAM 3.38 G: 3; .375 INJECTION, POWDER, FOR SOLUTION INTRAVENOUS at 13:26

## 2022-07-26 NOTE — ED TRIAGE NOTES
Patient here after her cat bit her L thumb and punctured through both ways and patient reports that she heard a crunching noise, animal is UTD with immun.  Has numbness to L thumb and movement is limited.  Last tdap was 10 years ago.  Anais Robb RN.......7/26/2022 12:03 PM     Triage Assessment     Row Name 07/26/22 1201       Triage Assessment (Adult)    Airway WDL WDL       Respiratory WDL    Respiratory WDL WDL       Skin Circulation/Temperature WDL    Skin Circulation/Temperature WDL WDL       Cardiac WDL    Cardiac WDL WDL       Peripheral/Neurovascular WDL    Peripheral Neurovascular WDL WDL       Cognitive/Neuro/Behavioral WDL    Cognitive/Neuro/Behavioral WDL WDL

## 2022-07-26 NOTE — DISCHARGE INSTRUCTIONS
As we discussed, we are very concerned about your finger infection.  Please take antibiotics as prescribed.  If there is any worsening of her symptoms, please return to the ER for admission

## 2022-07-26 NOTE — ED PROVIDER NOTES
Emergency Department Encounter         FINAL IMPRESSION:  Cat Bite, cellulitis        ED COURSE AND MEDICAL DECISION MAKING       ED Course as of 07/26/22 1346   Tue Jul 26, 2022   1312 Patient is a 42-year-old female healthy no chronic medical problems, here with left thumb pain after her cat bit her 24 hours ago.  States she noticed pus coming out of it over the past 24 hours.  No fevers, chills, nausea or vomiting.  Patient unable to move her thumb because of pain.  X-ray out of triage unremarkable.  Patient's Tdap is up-to-date.  On examination patient has signs and symptoms suggestive of evolving flexor tenosynovitis.  The base of her thumb is swollen and painful.  She has pain with passive extension as well as flexion.  She has pain to palpation along both the anterior and posterior flexion/extension tendon sheaths.  The digit is swollen compared to her other thumb.  Recommending IV antibiotics and admission.  Patient states she has childcare issues as well as work issues.  Plan for 1 dose of IV antibiotics here, basic labs for baseline evaluation and discharged home with Augmentin.  Again expressed my concern for her hand and recommended IV antibiotics and admission.       12:54 PM Received report from the PA student.  1:01 PM Met with patient for initial interview and exam. Discussed initial plan for care for their stay in the emergency department.    EKG      At the conclusion of the encounter I discussed the results of all the tests and the disposition. The questions were answered. The patient or family acknowledged understanding and was agreeable with the care plan.    MEDICATIONS GIVEN IN THE EMERGENCY DEPARTMENT:  Medications - No data to display    NEW PRESCRIPTIONS STARTED AT TODAY'S ED VISIT:  New Prescriptions    No medications on file       HPI     Patient information obtained from: patient    Use of Interpretor: N/A    Shobha SOMMERS Yovanny is a 42 year old female with a pertinent history of asthma,  alcohol and tobacco abuse, kidney stones, and bipolar I disorder who presents to this ED via walk in for evaluation of a cat bite.    Patient reports that she was bitten by her cat yesterday afternoon at the base of her left thumb, leaving a puncture wound on the dorsal aspect. She has been bit by her cat multiple times before without issues and the cat is fully vaccinated. However, yesterday the wound was draining some white pus, but this has since resolved. She does endorse swelling, pain, and redness to the thumb. She is able to rotate the thumb. Otherwise denies any other complaints.    REVIEW OF SYSTEMS:  Review of Systems   Constitutional: Negative for fever, malaise  HEENT: Negative runny nose, sore throat, ear pain, neck pain  Respiratory: Negative for shortness of breath, cough, congestion  Cardiovascular: Negative for chest pain, leg edema  Gastrointestinal: Negative for abdominal distention, abdominal pain, constipation, vomiting, nausea, diarrhea  Genitourinary: Negative for dysuria and hematuria.   Integument: Negative for rash. Positive for puncture wound to left thumb, white drainage (resolved).  Neurological: Negative for paresthesias, weakness, headache.  Musculoskeletal: Positive for left thumb pain and swelling.    All other systems reviewed and are negative.    MEDICAL HISTORY     Past Medical History:   Diagnosis Date     Alcohol abuse      Allergic rhinitis      Attention deficit disorder with hyperactivity(314.01)      Bipolar I disorder, single manic episode (H)      Calculus of kidney      Mild intermittent asthma without complication      Other abnormal Papanicolaou smear of cervix and cervical HPV(795.09)      Overweight (BMI 25.0-29.9)      Pyelonephritis      Tobacco abuse 12/21/2015       Past Surgical History:   Procedure Laterality Date     HC FRAGMENTING OF KIDNEY STONE      Description: Lithotripsy;  Recorded: 09/28/2007;     C REPR VSD      Description: VSD Repair Single;   "Recorded: 09/28/2007;       Social History     Tobacco Use     Smoking status: Current Every Day Smoker     Packs/day: 0.15     Types: Cigarettes     Smokeless tobacco: Never Used   Substance Use Topics     Alcohol use: Not Currently     Drug use: No     Types: Marijuana     Comment: Drug use: past use       albuterol (PROAIR HFA/PROVENTIL HFA/VENTOLIN HFA) 108 (90 Base) MCG/ACT inhaler  beclomethasone (QVAR) 40 mcg/actuation inhaler  econazole nitrate 1 % external cream  fluticasone (FLONASE) 50 mcg/actuation nasal spray  ibuprofen (ADVIL,MOTRIN) 600 MG tablet      PHYSICAL EXAM     /70   Pulse 111   Temp 98.5  F (36.9  C) (Oral)   Resp 18   Ht 1.626 m (5' 4\")   Wt 54.4 kg (120 lb)   LMP 07/24/2022 (Exact Date)   SpO2 96%   BMI 20.60 kg/m        PHYSICAL EXAM:     General: Patient appears well, nontoxic, comfortable  HEENT: Moist mucous membranes, no tongue swelling.  No head trauma.  No midline neck pain.  Cardiovascular: Normal rate, normal rhythm, no extremity edema.  No appreciable murmur.  Respiratory: No signs of respiratory distress, lungs are clear to auscultation bilaterally with no wheezes rhonchi or rales.  Abdominal: Soft, nontender, nondistended, no palpable masses, no guarding, no rebound  Musculoskeletal: Fusiform swelling of the left thumb.  2 puncture wounds noted in the anterior posterior aspect of the base of the thumb.  Pain with passive and active flexion and extension.  Mild redness and erythema spreading to the first metacarpal.  Neurological: Alert and oriented, grossly neurologically intact.  Psychological: Normal affect and mood.  Integument: No rashes appreciated        RESULTS       Labs Ordered and Resulted from Time of ED Arrival to Time of ED Departure   CBC WITH PLATELETS AND DIFFERENTIAL - Abnormal       Result Value    WBC Count        RBC Count 3.76 (*)     Hemoglobin 11.7      Hematocrit 34.4 (*)     MCV 92      MCH 31.1      MCHC 34.0      RDW 12.8      Platelet " Count 271     BASIC METABOLIC PANEL - Normal    Sodium 140      Potassium 4.4      Chloride 107      Carbon Dioxide (CO2) 26      Anion Gap 7      Urea Nitrogen 10      Creatinine 0.69      Calcium 9.4      Glucose 87      GFR Estimate >90     CRP INFLAMMATION - Normal    CRP 0.4     ERYTHROCYTE SEDIMENTATION RATE AUTO       Fingers XR, 2-3 views, left   Final Result   IMPRESSION: Soft tissue swelling. No radiopaque foreign body or soft tissue gas. Normal joint spaces and alignment. No fracture.             PROCEDURES:  Procedures:  Procedures       I, Trista Brooks am serving as a scribe to document services personally performed by Trino Chowdary DO, based on my observations and the provider's statements to me.  I, Trino Chowdary DO, attest that Trista Brooks is acting in a scribe capacity, has observed my performance of the services and has documented them in accordance with my direction.    Trino Chowdary DO  Emergency Medicine  [facility] EMERGENCY ROOM     Trino Chowdary DO  07/26/22 1873

## 2022-11-20 ENCOUNTER — HEALTH MAINTENANCE LETTER (OUTPATIENT)
Age: 42
End: 2022-11-20

## 2023-06-26 ENCOUNTER — HOSPITAL ENCOUNTER (OUTPATIENT)
Dept: GENERAL RADIOLOGY | Facility: HOSPITAL | Age: 43
Discharge: HOME | End: 2023-06-26
Attending: PHYSICIAN ASSISTANT | Admitting: PHYSICIAN ASSISTANT
Payer: COMMERCIAL

## 2023-06-26 ENCOUNTER — TELEPHONE (OUTPATIENT)
Dept: FAMILY MEDICINE | Facility: CLINIC | Age: 43
End: 2023-06-26

## 2023-06-26 ENCOUNTER — OFFICE VISIT (OUTPATIENT)
Dept: FAMILY MEDICINE | Facility: CLINIC | Age: 43
End: 2023-06-26
Payer: COMMERCIAL

## 2023-06-26 VITALS
SYSTOLIC BLOOD PRESSURE: 131 MMHG | WEIGHT: 132.4 LBS | HEART RATE: 84 BPM | RESPIRATION RATE: 16 BRPM | OXYGEN SATURATION: 98 % | TEMPERATURE: 98.7 F | DIASTOLIC BLOOD PRESSURE: 77 MMHG | BODY MASS INDEX: 22.73 KG/M2

## 2023-06-26 DIAGNOSIS — M79.671 RIGHT FOOT PAIN: Primary | ICD-10-CM

## 2023-06-26 DIAGNOSIS — R21 RASH, SKIN: ICD-10-CM

## 2023-06-26 LAB
ALBUMIN SERPL BCG-MCNC: 4.2 G/DL (ref 3.5–5.2)
ALP SERPL-CCNC: 47 U/L (ref 35–104)
ALT SERPL W P-5'-P-CCNC: 7 U/L (ref 0–50)
ANION GAP SERPL CALCULATED.3IONS-SCNC: 9 MMOL/L (ref 7–15)
AST SERPL W P-5'-P-CCNC: 22 U/L (ref 0–45)
BASOPHILS # BLD AUTO: 0.1 10E3/UL (ref 0–0.2)
BASOPHILS NFR BLD AUTO: 1 %
BILIRUB SERPL-MCNC: <0.2 MG/DL
BUN SERPL-MCNC: 5.5 MG/DL (ref 6–20)
CALCIUM SERPL-MCNC: 9.4 MG/DL (ref 8.6–10)
CHLORIDE SERPL-SCNC: 102 MMOL/L (ref 98–107)
CREAT SERPL-MCNC: 0.65 MG/DL (ref 0.51–0.95)
DEPRECATED HCO3 PLAS-SCNC: 27 MMOL/L (ref 22–29)
EOSINOPHIL # BLD AUTO: 0.2 10E3/UL (ref 0–0.7)
EOSINOPHIL NFR BLD AUTO: 2 %
ERYTHROCYTE [DISTWIDTH] IN BLOOD BY AUTOMATED COUNT: 13 % (ref 10–15)
GFR SERPL CREATININE-BSD FRML MDRD: >90 ML/MIN/1.73M2
GLUCOSE SERPL-MCNC: 87 MG/DL (ref 70–99)
HCT VFR BLD AUTO: 33 % (ref 35–47)
HGB BLD-MCNC: 11.4 G/DL (ref 11.7–15.7)
IMM GRANULOCYTES # BLD: 0 10E3/UL
IMM GRANULOCYTES NFR BLD: 0 %
LYMPHOCYTES # BLD AUTO: 3.5 10E3/UL (ref 0.8–5.3)
LYMPHOCYTES NFR BLD AUTO: 41 %
MCH RBC QN AUTO: 31.6 PG (ref 26.5–33)
MCHC RBC AUTO-ENTMCNC: 34.5 G/DL (ref 31.5–36.5)
MCV RBC AUTO: 91 FL (ref 78–100)
MONOCYTES # BLD AUTO: 0.6 10E3/UL (ref 0–1.3)
MONOCYTES NFR BLD AUTO: 7 %
NEUTROPHILS # BLD AUTO: 4.2 10E3/UL (ref 1.6–8.3)
NEUTROPHILS NFR BLD AUTO: 50 %
PLATELET # BLD AUTO: 299 10E3/UL (ref 150–450)
POTASSIUM SERPL-SCNC: 4.2 MMOL/L (ref 3.4–5.3)
PROT SERPL-MCNC: 7.1 G/DL (ref 6.4–8.3)
RBC # BLD AUTO: 3.61 10E6/UL (ref 3.8–5.2)
SODIUM SERPL-SCNC: 138 MMOL/L (ref 136–145)
URATE SERPL-MCNC: 2.8 MG/DL (ref 2.4–5.7)
WBC # BLD AUTO: 8.5 10E3/UL (ref 4–11)

## 2023-06-26 PROCEDURE — 80053 COMPREHEN METABOLIC PANEL: CPT | Performed by: PHYSICIAN ASSISTANT

## 2023-06-26 PROCEDURE — 99204 OFFICE O/P NEW MOD 45 MIN: CPT | Performed by: PHYSICIAN ASSISTANT

## 2023-06-26 PROCEDURE — 84550 ASSAY OF BLOOD/URIC ACID: CPT | Performed by: PHYSICIAN ASSISTANT

## 2023-06-26 PROCEDURE — 85025 COMPLETE CBC W/AUTO DIFF WBC: CPT | Performed by: PHYSICIAN ASSISTANT

## 2023-06-26 PROCEDURE — 36415 COLL VENOUS BLD VENIPUNCTURE: CPT | Performed by: PHYSICIAN ASSISTANT

## 2023-06-26 PROCEDURE — 73630 X-RAY EXAM OF FOOT: CPT | Mod: RT

## 2023-06-26 RX ORDER — INDOMETHACIN 50 MG/1
50 CAPSULE ORAL 2 TIMES DAILY WITH MEALS
Qty: 30 CAPSULE | Refills: 0 | Status: SHIPPED | OUTPATIENT
Start: 2023-06-26

## 2023-06-26 RX ORDER — ECONAZOLE NITRATE 10 MG/G
CREAM TOPICAL DAILY
Qty: 85 G | Refills: 1 | Status: SHIPPED | OUTPATIENT
Start: 2023-06-26

## 2023-06-26 NOTE — PATIENT INSTRUCTIONS
(C26.963) Right foot pain  (primary encounter diagnosis)  Comment: contusion verus gout  Plan: Uric acid, XR Foot Right G/E 3 Views,         indomethacin (INDOCIN) 50 MG capsule         Rest.   See handout on gout for foods to avoid.   Increase water intake.    Follow up with orthopedics or podiatry should symptoms persist or worsen.    Referral placed for Ortho.  If they do not contact you within the next 2 days, please call: [phone] to make an appointment.      (R21) Rash, skin  Comment:   Plan: CBC with platelets and differential,         Comprehensive metabolic panel (BMP + Alb, Alk         Phos, ALT, AST, Total. Bili, TP), Uric acid,         econazole nitrate 1 % external cream          Follow up with your primary clinic or dermatology, referral placed today.   Adult Dermatology Referral.  If you do not hear from them within 2 days please call them at [phone] to make an appointment.

## 2023-06-26 NOTE — TELEPHONE ENCOUNTER
Reason for Call:  Appointment Request    Patient requesting this type of appt:  Preventive     Requested provider: Sadaf Brock    Reason patient unable to be scheduled: Provider Schedule Template not Released beyond 7/3/2023. Please call and advise    When does patient want to be seen/preferred time: next available- daughter would also like to scheduled with her     Comments: see above    Could we send this information to you in [place] [place] or would you prefer to receive a phone call?:   Patient would prefer a phone call   Okay to leave a detailed message?: Yes at Cell number on file:  Telephone Information:  Mobile          [phone]      Call taken on 6/26/2023 at 2:33 PM by Brittany Mondragon

## 2023-06-26 NOTE — PROGRESS NOTES
Patient presents with:  Foot Injury: Rt foot big toe injury x Saturday. Little red. Some swelling.    (M79.671) Right foot pain  (primary encounter diagnosis)  Comment: contusion verus gout  Plan: Uric acid, XR Foot Right G/E 3 Views,         indomethacin (INDOCIN) 50 MG capsule         Rest.   See handout on gout for foods to avoid.   Increase water intake.    Follow up with orthopedics or podiatry should symptoms persist or worsen.      (R21) Rash, skin  Comment:   Plan: CBC with platelets and differential,         Comprehensive metabolic panel (BMP + Alb, Alk         Phos, ALT, AST, Total. Bili, TP), Uric acid,         econazole nitrate 1 % external cream          Follow up with your primary clinic or dermatology.        60 minutes spent by me on the date of the encounter doing chart review, review of test results, interpretation of tests, patient visit, documentation and discussion with other provider(s)       Addendum:  4:05 PM message left for patient regarding essentially normal lab tests.  Advised follow-up with primary clinic within a month for recheck.  Also reiterated the referrals placed for dermatology and orthopedics.      SUBJECTIVE:   Shobha Hairston is a 43 year old female who presents today in urgent care after a scheduling error with primary care within the clinic.  She states she has had right foot pain for the past 2 days, after dropping her vaping cartridge on her foot.  Denies any other injury.  She has a past medical history for a bunionectomy.  She complains of pain and a sense of swelling in her foot since the incident.     Patient also would like to have her blood tested to make sure her kidneys and liver are okay as she would like to get a prescription for an oral antifungal for the rash on her hands.  She states that she has used the topical antifungal in the past with some relief of her symptoms however there is still some rash that persists.  She has not been seen by dermatology, but  was prescribed the cream by her primary practitioner in December 2021.     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        Past Medical History:   Diagnosis Date     Alcohol abuse     Created by Conversion  Replacement Utility updated for latest IMO load     Allergic rhinitis     Created by Conversion  Replacement Utility updated for latest IMO load     Attention deficit disorder with hyperactivity(314.01)     Created by Conversion      Bipolar I disorder, single manic episode (H)     Created by Conversion  Replacement Utility updated for latest IMO load     Calculus of kidney     Created by Conversion [facility] Annotation: Sep 28 2007  4:38PM - Vanessa Garner: '02, '03      Mild intermittent asthma without complication     Created by Conversion      Other abnormal Papanicolaou smear of cervix and cervical HPV(795.09)     Created by Conversion      Overweight (BMI 25.0-29.9)     Created by Conversion      Pyelonephritis     Created by Conversion  Replacement Utility updated for latest IMO load     Tobacco abuse 12/21/2015         Current Outpatient Medications   Medication Sig Dispense Refill     Multiple Vitamins-Iron (DAILY-MARIA C/IRON/BETA-CAROTENE) TABS TAKE 1 TABLET BY MOUTH DAILY. (Patient not taking: Reported on 10/19/2020) 30 tablet 7     Social History     Tobacco Use     Smoking status: Never Smoker     Smokeless tobacco: Never Used   Substance Use Topics     Alcohol use: Not on file     Family History   Problem Relation Age of Onset     Diabetes Mother      Diabetes Father          ROS:    10 point ROS of systems including Constitutional, Eyes, Respiratory, Cardiovascular, Gastroenterology, Genitourinary, Integumentary, Muscularskeletal, Psychiatric ,neurological were all negative except for pertinent positives noted in my HPI        OBJECTIVE:  /77   Pulse 84   Temp 98.7  F (37.1  C) (Oral)   Resp 16   Wt 60.1 kg (132 lb 6.4 oz)   SpO2 98%   BMI 22.73 kg/m    Physical Exam:  GENERAL APPEARANCE: healthy, alert and no distress  EYES: EOMI,  PERRL, conjunctiva clear  Extremities: no peripheral edema, peripheral pulses normal.  Left foot: Tenderness at MTP as well as along first metatarsal.  No obvious bony abnormality.  Surgical scar on foot.  No open sores or erythema.  Some warmth at the MTP.  Great toenails have been removed and are healed without nails.    NEURO: Normal strength and tone, sensory exam grossly normal,  normal speech and mentation  SKIN: Dry skin on fingers, with peeling.  No erythema.    PSYCH: mentation appears normal    X-Ray was done, my findings are: Hardware intact no bony abnormality.    CBC reveals slightly low hemoglobin, advise follow up with primary clinic for re-check within a month.      CMP mildly decreased BUN, advise Follow up with PCP for re-check within a month  Uric acid is within normal limits    Results for orders placed or performed in visit on 06/26/23   XR Foot Right G/E 3 Views     Status: None    Narrative    EXAM: XR FOOT RIGHT G/E 3 VIEWS  LOCATION: [facility]  DATE: 6/26/2023    INDICATION: Right foot pain medial aspect, dropped something on her foot a couple of days ago. H/o bunionectomy.  COMPARISON: None.      Impression    IMPRESSION: Postoperative changes to the 1st metatarsal with screw fixation. No evidence for acute fracture. Right foot otherwise negative.   Comprehensive metabolic panel (BMP + Alb, Alk Phos, ALT, AST, Total. Bili, TP)     Status: Abnormal   Result Value Ref Range    Sodium 138 136 - 145 mmol/L    Potassium 4.2 3.4 - 5.3 mmol/L    Chloride 102 98 - 107 mmol/L    Carbon Dioxide (CO2) 27 22 - 29 mmol/L    Anion Gap 9 7 - 15 mmol/L    Urea Nitrogen 5.5 (L) 6.0 - 20.0 mg/dL    Creatinine 0.65 0.51 - 0.95 mg/dL    Calcium 9.4 8.6 - 10.0  mg/dL    Glucose 87 70 - 99 mg/dL    Alkaline Phosphatase 47 35 - 104 U/L    AST 22 0 - 45 U/L    ALT 7 0 - 50 U/L    Protein Total 7.1 6.4 - 8.3 g/dL    Albumin 4.2 3.5 - 5.2 g/dL    Bilirubin Total <0.2 <=1.2 mg/dL    GFR Estimate >90 >60 mL/min/1.73m2   Uric acid     Status: Normal   Result Value Ref Range    Uric Acid 2.8 2.4 - 5.7 mg/dL   CBC with platelets and differential     Status: Abnormal   Result Value Ref Range    WBC Count 8.5 4.0 - 11.0 10e3/uL    RBC Count 3.61 (L) 3.80 - 5.20 10e6/uL    Hemoglobin 11.4 (L) 11.7 - 15.7 g/dL    Hematocrit 33.0 (L) 35.0 - 47.0 %    MCV 91 78 - 100 fL    MCH 31.6 26.5 - 33.0 pg    MCHC 34.5 31.5 - 36.5 g/dL    RDW 13.0 10.0 - 15.0 %    Platelet Count 299 150 - 450 10e3/uL    % Neutrophils 50 %    % Lymphocytes 41 %    % Monocytes 7 %    % Eosinophils 2 %    % Basophils 1 %    % Immature Granulocytes 0 %    Absolute Neutrophils 4.2 1.6 - 8.3 10e3/uL    Absolute Lymphocytes 3.5 0.8 - 5.3 10e3/uL    Absolute Monocytes 0.6 0.0 - 1.3 10e3/uL    Absolute Eosinophils 0.2 0.0 - 0.7 10e3/uL    Absolute Basophils 0.1 0.0 - 0.2 10e3/uL    Absolute Immature Granulocytes 0.0 <=0.4 10e3/uL   CBC with platelets and differential     Status: Abnormal    Narrative    The following orders were created for panel order CBC with platelets and differential.  Procedure                               Abnormality         Status                     ---------                               -----------         ------                     CBC with platelets and d...[[id number]]  Abnormal            Final result                 Please view results for these tests on the individual orders.

## 2023-06-28 NOTE — TELEPHONE ENCOUNTER
Left non-detailed message for Rufina, mother, consent on file, to return a call to the clinic RN.       Sadaf Brock is a float provider and is not able to be assigned as a primary care provider.    We are happy to select a different PCP for ptJuanito Ward RN

## 2023-07-08 ENCOUNTER — HEALTH MAINTENANCE LETTER (OUTPATIENT)
Age: 43
End: 2023-07-08

## 2023-07-11 ENCOUNTER — OFFICE VISIT (OUTPATIENT)
Dept: PODIATRY | Facility: CLINIC | Age: 43
End: 2023-07-11
Payer: COMMERCIAL

## 2023-07-11 ENCOUNTER — TELEPHONE (OUTPATIENT)
Dept: DERMATOLOGY | Facility: CLINIC | Age: 43
End: 2023-07-11

## 2023-07-11 VITALS — WEIGHT: 132 LBS | BODY MASS INDEX: 22.53 KG/M2 | HEIGHT: 64 IN

## 2023-07-11 DIAGNOSIS — M25.571 PAIN IN JOINT OF RIGHT FOOT: Primary | ICD-10-CM

## 2023-07-11 DIAGNOSIS — M79.671 RIGHT FOOT PAIN: ICD-10-CM

## 2023-07-11 PROCEDURE — 99243 OFF/OP CNSLTJ NEW/EST LOW 30: CPT | Performed by: PODIATRIST

## 2023-07-11 NOTE — TELEPHONE ENCOUNTER
This encounter is being sent to inform the clinic that this patient has a referral from Alejo Perry for the diagnoses of R21 (ICD-10-CM) - Rash, skin:  Patient thinks it is either Athletes Foot or a Fungal Infection and has requested that this patient be seen within 1-2 Weeks  and/or with [place] [facility]  Based on the availability of our provider(s), we are unable to accommodate this request.      Were all sites offered this patient?  Yes      Does scheduling algorithm request to schedule next available?  No:[id number]    [place] LOCATION is the Preferred Location    Comments:   Urgent Referral in 1-2 Weeks:  R21 (ICD-10-CM) - Rash, skin:  Patient thinks it is either Athletes Foot or a Fungal Infection/ Referral by: ALEJO PERRY R/ [place] PtShobha/ Call Shobha at: [phone]

## 2023-07-11 NOTE — PROGRESS NOTES
ASSESSMENT:  Encounter Diagnoses   Name Primary?     Pain in joint of right foot Yes     Right foot pain      MEDICAL DECISION MAKING:  I personally reviewed the previous x-ray images with Shobha.  There is a surgical screw in the distal right first metatarsal.  No apparent complication with this.  Subtle irregularities of the first metatarsal phalangeal joint which might indicate early degenerative changes.  No other findings.    I agree with renewing her orthoses.  This is helped relieve the pain in the past.  I also suggest she consider a stiffer soled shoe and see if this helps offload the joint.    Explained that hardware can sometimes be a source of pain, even when it is not prominent.  Removal is an option, if conservative treatment does not help.    I explained how she might have developed some osteoarthritis secondary to the bunion deformity.  Although the toe is well aligned status post surgery, degenerative changes persist.    Follow-up on an as-needed basis.    Disclaimer: This note consists of symbols derived from keyboarding, dictation and/or voice recognition software. As a result, there may be errors in the script that have gone undetected. Please consider this when interpreting information found in this chart.    Los Myers DPM, FACFAS, MS    [place] [facility] of Podiatry/Foot & Ankle Surgery      ____________________________________________________________________    HPI:       I was asked by Mikala Vargas PA-C to evaluate this patient for right foot pain.  Shobha presents today primarily seeking new orthotic devices.  These were originally recommended by an occupational therapist she was working with after her bunion surgery, right foot.  Bunion surgery was approximately 6 years ago.  She has some retained hardware and questions if that is the source of pain.  She experiences some pain around the right first metatarsal phalangeal joint.  She works as a haines, weightbearing for  many hours.  X-rays were done when she visited [facility] on 6/26/2023.  At that time there was documented redness and swelling.  *  Past Medical History:   Diagnosis Date     Alcohol abuse     Created by Conversion  Replacement Utility updated for latest IMO load     Allergic rhinitis     Created by Conversion  Replacement Utility updated for latest IMO load     Attention deficit disorder with hyperactivity(314.01)     Created by Conversion      Bipolar I disorder, single manic episode (H)     Created by Conversion  Replacement Utility updated for latest IMO load     Calculus of kidney     Created by Conversion [facility] Annotation: Sep 28 2007  4:38PM - Vanessa Garner: '02, '03      Mild intermittent asthma without complication     Created by Conversion      Other abnormal Papanicolaou smear of cervix and cervical HPV(795.09)     Created by Conversion      Overweight (BMI 25.0-29.9)     Created by Conversion      Pyelonephritis     Created by Conversion  Replacement Utility updated for latest IMO load     Tobacco abuse 12/21/2015   *  *  Past Surgical History:   Procedure Laterality Date     HC FRAGMENTING OF KIDNEY STONE      Description: Lithotripsy;  Recorded: 09/28/2007;     ZZC REPR VSD      Description: VSD Repair Single;  Recorded: 09/28/2007;   *  *  Current Outpatient Medications   Medication Sig Dispense Refill     albuterol (PROAIR HFA/PROVENTIL HFA/VENTOLIN HFA) 108 (90 Base) MCG/ACT inhaler Inhale 2 puffs into the lungs every 6 hours as needed for wheezing 18 g 0     econazole nitrate 1 % external cream Apply topically daily 85 g 1     beclomethasone (QVAR) 40 mcg/actuation inhaler [BECLOMETHASONE (QVAR) 40 MCG/ACTUATION INHALER] Inhale 2 puffs 2 (two) times a day. (Patient not taking: Reported on 12/29/2021) 1 Inhaler 2     fluticasone (FLONASE) 50 mcg/actuation nasal spray [FLUTICASONE (FLONASE) 50 MCG/ACTUATION NASAL SPRAY] USE ONE TO TWO SPRAY(S) IN EACH NOSTRIL ONCE DAILY (Patient not  "taking: Reported on 12/29/2021) 16 g 2     ibuprofen (ADVIL,MOTRIN) 600 MG tablet [IBUPROFEN (ADVIL,MOTRIN) 600 MG TABLET] Take 1 tablet (600 mg total) by mouth every 6 (six) hours as needed for pain. (Patient not taking: Reported on 12/29/2021) 60 tablet 1     indomethacin (INDOCIN) 50 MG capsule Take 1 capsule (50 mg) by mouth 2 times daily (with meals) (Patient not taking: Reported on 7/11/2023) 30 capsule 0         EXAM:    Vitals: Ht 1.626 m (5' 4\")   Wt 59.9 kg (132 lb)   BMI 22.66 kg/m    BMI: Body mass index is 22.66 kg/m .    Constitutional:  Shobha Hairston is in no apparent distress, appears well-nourished.  Cooperative with history and physical exam.    Vascular:  Pedal pulses are palpable for both the DP and PT arteries.  CFT < 3 sec.  No edema.      Neuro: Light touch sensation is intact to the L4, L5, S1 distributions  No evidence of weakness, spasticity, or contracture in the lower extremities.     Derm: Normal texture and turgor.  No erythema, ecchymosis, or cyanosis.  No open lesions.     Musculoskeletal:    Lower extremity muscle strength is normal. No gross deformities.  Right hallux is rectus with preserved first metatarsophalangeal joint range of motion.  Palpatory exam does not reveal any prominent hardware.    EXAM: XR FOOT RIGHT G/E 3 VIEWS  LOCATION: [facility]  DATE: 6/26/2023     INDICATION: Right foot pain medial aspect, dropped something on her foot a couple of days ago. H/o bunionectomy.  COMPARISON: None.                                                                      IMPRESSION: Postoperative changes to the 1st metatarsal with screw fixation. No evidence for acute fracture. Right foot otherwise negative.        "

## 2023-07-11 NOTE — PATIENT INSTRUCTIONS
"Thank you for choosing [facility] Podiatry / Foot & Ankle Surgery!    DR. ORTIZ'S CLINIC LOCATIONS:     [facility] TRIAGE LINE: [phone]   [address] [place] [address] APPOINTMENTS: [phone]   [place] [address] RADIOLOGY: [phone]   (Every other Tues - Wed - Fri PM) SET UP SURGERY: [phone]    PHYSICAL THERAPY: [phone]   [place] SPECIALTY BILLING QUESTIONS: [phone]   [address] FAX: [phone]   [place] [address]    (Thurs & Fri AM)     [place] [facility] LOCATIONS  [facility]  [address]  [place] [address]  Phone: [phone]  Fax: [phone] [facility] Building  [address] [place] [address] [address]  [place] [address]  Phone: [phone]   Fax: [phone]   [facility]  [address]  [place] [address]  Phone: [phone]  Fax: [phone] [facility]  [address]  [place] [address]  Phone: [phone]   Fax: [phone]   [facility]   [address]  [place] [address]  Phone: [phone]  Fax: [phone] * Please call any location listed to make an appointment for a casting/fitting. Your referral was sent to their central office and they will all have the order on file.    FOREFOOT PAIN RECOMMENDATIONS    1) Please consider stiffer/ rigid - soled shoes as much as possible. These take stress off of the ball of your foot.    2) Avoid barefoot walking, walking in socks, flexible shoes and flip flops.    3) It is a good idea to wear a shoe at all times, including when at home.    4) Consider purchasing a felt metatarsal pad.  A good brand is \"Hapad.\"  You can find these and others online.  Also, these can be built into custom/prescription arch supports.    5) Consider a quality over-the-counter arch support. These can be found at [employer].  If Dr. Ortiz prescribed custom orthoses, please consider this " option.    6) Ice and anti inflammatories can be helpful, earlier on in the process.  Anti inflammatories are not good for you, if take for a long period of time.      Home

## 2023-07-11 NOTE — LETTER
7/11/2023         RE: Shobha Hairston  [address] [place] [address]        Dear Colleague,    Thank you for referring your patient, Shobha Hairston, to the [facility]. Please see a copy of my visit note below.    ASSESSMENT:  Encounter Diagnoses   Name Primary?     Pain in joint of right foot Yes     Right foot pain      MEDICAL DECISION MAKING:  I personally reviewed the previous x-ray images with Shobha.  There is a surgical screw in the distal right first metatarsal.  No apparent complication with this.  Subtle irregularities of the first metatarsal phalangeal joint which might indicate early degenerative changes.  No other findings.    I agree with renewing her orthoses.  This is helped relieve the pain in the past.  I also suggest she consider a stiffer soled shoe and see if this helps offload the joint.    Explained that hardware can sometimes be a source of pain, even when it is not prominent.  Removal is an option, if conservative treatment does not help.    I explained how she might have developed some osteoarthritis secondary to the bunion deformity.  Although the toe is well aligned status post surgery, degenerative changes persist.    Follow-up on an as-needed basis.    Disclaimer: This note consists of symbols derived from keyboarding, dictation and/or voice recognition software. As a result, there may be errors in the script that have gone undetected. Please consider this when interpreting information found in this chart.    Los Myers DPM, FACFAS, [facility] Department of Podiatry/Foot & Ankle Surgery      ____________________________________________________________________    HPI:       I was asked by Mikala Vargas PA-C to evaluate this patient for right foot pain.  Shobha presents today primarily seeking new orthotic devices.  These were originally recommended by an occupational therapist she was working with after her bunion  surgery, right foot.  Bunion surgery was approximately 6 years ago.  She has some retained hardware and questions if that is the source of pain.  She experiences some pain around the right first metatarsal phalangeal joint.  She works as a haines, weightbearing for many hours.  X-rays were done when she visited [facility] on 6/26/2023.  At that time there was documented redness and swelling.  *  Past Medical History:   Diagnosis Date     Alcohol abuse     Created by Conversion  Replacement Utility updated for latest IMO load     Allergic rhinitis     Created by Conversion  Replacement Utility updated for latest IMO load     Attention deficit disorder with hyperactivity(314.01)     Created by Conversion      Bipolar I disorder, single manic episode (H)     Created by Conversion  Replacement Utility updated for latest IMO load     Calculus of kidney     Created by Conversion [facility] Annotation: Sep 28 2007  4:38PM - Vanessa Garner: '02, '03      Mild intermittent asthma without complication     Created by Conversion      Other abnormal Papanicolaou smear of cervix and cervical HPV(795.09)     Created by Conversion      Overweight (BMI 25.0-29.9)     Created by Conversion      Pyelonephritis     Created by Conversion  Replacement Utility updated for latest IMO load     Tobacco abuse 12/21/2015   *  *  Past Surgical History:   Procedure Laterality Date     HC FRAGMENTING OF KIDNEY STONE      Description: Lithotripsy;  Recorded: 09/28/2007;     ZZC REPR VSD      Description: VSD Repair Single;  Recorded: 09/28/2007;   *  *  Current Outpatient Medications   Medication Sig Dispense Refill     albuterol (PROAIR HFA/PROVENTIL HFA/VENTOLIN HFA) 108 (90 Base) MCG/ACT inhaler Inhale 2 puffs into the lungs every 6 hours as needed for wheezing 18 g 0     econazole nitrate 1 % external cream Apply topically daily 85 g 1     beclomethasone (QVAR) 40 mcg/actuation inhaler [BECLOMETHASONE (QVAR) 40 MCG/ACTUATION INHALER]  "Inhale 2 puffs 2 (two) times a day. (Patient not taking: Reported on 12/29/2021) 1 Inhaler 2     fluticasone (FLONASE) 50 mcg/actuation nasal spray [FLUTICASONE (FLONASE) 50 MCG/ACTUATION NASAL SPRAY] USE ONE TO TWO SPRAY(S) IN EACH NOSTRIL ONCE DAILY (Patient not taking: Reported on 12/29/2021) 16 g 2     ibuprofen (ADVIL,MOTRIN) 600 MG tablet [IBUPROFEN (ADVIL,MOTRIN) 600 MG TABLET] Take 1 tablet (600 mg total) by mouth every 6 (six) hours as needed for pain. (Patient not taking: Reported on 12/29/2021) 60 tablet 1     indomethacin (INDOCIN) 50 MG capsule Take 1 capsule (50 mg) by mouth 2 times daily (with meals) (Patient not taking: Reported on 7/11/2023) 30 capsule 0         EXAM:    Vitals: Ht 1.626 m (5' 4\")   Wt 59.9 kg (132 lb)   BMI 22.66 kg/m    BMI: Body mass index is 22.66 kg/m .    Constitutional:  Shobha Hairston is in no apparent distress, appears well-nourished.  Cooperative with history and physical exam.    Vascular:  Pedal pulses are palpable for both the DP and PT arteries.  CFT < 3 sec.  No edema.      Neuro: Light touch sensation is intact to the L4, L5, S1 distributions  No evidence of weakness, spasticity, or contracture in the lower extremities.     Derm: Normal texture and turgor.  No erythema, ecchymosis, or cyanosis.  No open lesions.     Musculoskeletal:    Lower extremity muscle strength is normal. No gross deformities.  Right hallux is rectus with preserved first metatarsophalangeal joint range of motion.  Palpatory exam does not reveal any prominent hardware.    EXAM: XR FOOT RIGHT G/E 3 VIEWS  LOCATION: [facility]  DATE: 6/26/2023     INDICATION: Right foot pain medial aspect, dropped something on her foot a couple of days ago. H/o bunionectomy.  COMPARISON: None.                                                                      IMPRESSION: Postoperative changes to the 1st metatarsal with screw fixation. No evidence for acute fracture. Right foot " otherwise negative.            Again, thank you for allowing me to participate in the care of your patient.        Sincerely,        Los Myers DPM

## 2023-07-11 NOTE — TELEPHONE ENCOUNTER
1st attempt to reach patient.    Left message on answering machine for patient to call back.    Thank you,    Paulina TIANRN BSN  [facility]- [phone]

## 2023-07-12 NOTE — TELEPHONE ENCOUNTER
S/w pt and scheduled on Thursday July 13th at 11:30 am with Kristi Yan at [facility].      Sarah SPICER RN  [facility] [place]  [phone]

## 2023-07-13 ENCOUNTER — OFFICE VISIT (OUTPATIENT)
Dept: DERMATOLOGY | Facility: CLINIC | Age: 43
End: 2023-07-13
Payer: COMMERCIAL

## 2023-07-13 DIAGNOSIS — D48.5 NEOPLASM OF UNCERTAIN BEHAVIOR OF SKIN: ICD-10-CM

## 2023-07-13 DIAGNOSIS — L40.9 PSORIASIS: Primary | ICD-10-CM

## 2023-07-13 PROCEDURE — 88305 TISSUE EXAM BY PATHOLOGIST: CPT | Performed by: DERMATOLOGY

## 2023-07-13 PROCEDURE — 99204 OFFICE O/P NEW MOD 45 MIN: CPT | Mod: 25 | Performed by: PHYSICIAN ASSISTANT

## 2023-07-13 PROCEDURE — 88342 IMHCHEM/IMCYTCHM 1ST ANTB: CPT | Performed by: DERMATOLOGY

## 2023-07-13 PROCEDURE — 11102 TANGNTL BX SKIN SINGLE LES: CPT | Performed by: PHYSICIAN ASSISTANT

## 2023-07-13 RX ORDER — CLOBETASOL PROPIONATE 0.5 MG/G
CREAM TOPICAL
Qty: 60 G | Refills: 3 | Status: SHIPPED | OUTPATIENT
Start: 2023-07-13 | End: 2023-12-14

## 2023-07-13 ASSESSMENT — PAIN SCALES - GENERAL: PAINLEVEL: NO PAIN (0)

## 2023-07-13 NOTE — LETTER
7/13/2023         RE: Shobha Hairston  [address] [place] [address]        Dear Colleague,    Thank you for referring your patient, Shobha Hairston, to the [facility]. Please see a copy of my visit note below.    HPI:   Chief complaints: Shobha Hairston is a pleasant 43 year old female who presents for evaluation of a persistent rash on both palms and the bottoms of both feet. She has had the rash for about 4-5 years. She has been using econazole cream which does help a little but never clears her. Her hands will crack and become very painful. No morning joint pain.     She also has a new mole on the chest which appeared about 6 months ago. No history of skin CA    Shx: Perez; works for [employer] in [place]    PHYSICAL EXAM:    There were no vitals taken for this visit.  Skin exam performed as follows: Type 1 skin. Mood appropriate  Alert and Oriented X 3. Well developed, well nourished in no distress.  General appearance: Normal  Head including face: Normal  Eyes: conjunctiva and lids: Normal  Mouth: Lips, teeth, gums: Normal  Neck: Normal  Skin: Scalp and body hair: See below    Psoriasiform dermatitis on bilateral palms with fissuring  3 mm dark brown macule on the left chest    ASSESSMENT/PLAN:     Psoriasis - discussed diagnosis and treatment options  --Start clobetasol cream BID x 1-2 weeks then PRN only    R/o atypical nevus on the left chest. Shave biopsy performed.  Area cleaned and anesthetized with 1% lidocaine with epinephrine.  Dermablade used to remove the lesion and sent to pathology. Bleeding was cauterized. Pt tolerated procedure well with no complications.             Follow-up: pending path  CC:   Scribed By: Kristi Grewal, MS, ERICA      Again, thank you for allowing me to participate in the care of your patient.        Sincerely,        Kristi Grewal PA-C

## 2023-07-13 NOTE — NURSING NOTE
Shobha Hairston's chief complaint for this visit includes:  Chief Complaint   Patient presents with     Derm Problem     Patient noticed X 4 years layers of her skin on feet, hands and toes will peel off and will feel like glass is present and painful. Patient rates pain from a 0-10/10 depending on when she flares. Patient is using econazole cream which helps but doesn't fully take away issues.        PCP: Sadaf Brock    Referring Provider:  No referring provider defined for this encounter.    There were no vitals taken for this visit.  No Pain (0)        Allergies   Allergen Reactions     Morphine Anaphylaxis     Codeine Nausea         Do you need any medication refills at today's visit? No    Nichelle Villegas MA

## 2023-07-13 NOTE — PROGRESS NOTES
HPI:   Chief complaints: Shobha Hairston is a pleasant 43 year old female who presents for evaluation of a persistent rash on both palms and the bottoms of both feet. She has had the rash for about 4-5 years. She has been using econazole cream which does help a little but never clears her. Her hands will crack and become very painful. No morning joint pain.     She also has a new mole on the chest which appeared about 6 months ago. No history of skin CA    Shx: Perez; works for [employer] in [employer]    PHYSICAL EXAM:    There were no vitals taken for this visit.  Skin exam performed as follows: Type 1 skin. Mood appropriate  Alert and Oriented X 3. Well developed, well nourished in no distress.  General appearance: Normal  Head including face: Normal  Eyes: conjunctiva and lids: Normal  Mouth: Lips, teeth, gums: Normal  Neck: Normal  Skin: Scalp and body hair: See below    Psoriasiform dermatitis on bilateral palms with fissuring  3 mm dark brown macule on the left chest    ASSESSMENT/PLAN:     Psoriasis - discussed diagnosis and treatment options  --Start clobetasol cream BID x 1-2 weeks then PRN only    R/o atypical nevus on the left chest. Shave biopsy performed.  Area cleaned and anesthetized with 1% lidocaine with epinephrine.  Dermablade used to remove the lesion and sent to pathology. Bleeding was cauterized. Pt tolerated procedure well with no complications.             Follow-up: pending path  CC:   Scribed By: Kristi Yan, MS, PADEVAUGHN

## 2023-07-13 NOTE — PATIENT INSTRUCTIONS
This is psoriasis    Start using clobetasol cream twice per day for 1-2 weeks at a time when needed      Wound Care Instructions     FOR SUPERFICIAL WOUNDS     [facility]  [phone]                 AFTER 24 HOURS YOU SHOULD REMOVE THE BANDAGE AND BEGIN DAILY DRESSING CHANGES AS FOLLOWS:     1) Remove Dressing.     2) Clean and dry the area with tap water using a Q-tip or sterile gauze pad.     3) Apply Vaseline, Aquaphor, Polysporin ointment or Bacitracin ointment over entire wound.  Do NOT use Neosporin ointment.     4) Cover the wound with a band-aid, or a sterile non-stick gauze pad and micropore paper tape    REPEAT THESE INSTRUCTIONS AT LEAST ONCE A DAY UNTIL THE WOUND HAS COMPLETELY HEALED.    It is an old wives tale that a wound heals better when it is exposed to air and allowed to dry out. The wound will heal faster with a better cosmetic result if it is kept moist with ointment and covered with a bandage.    **Do not let the wound dry out.**    Supplies Needed:      *Cotton tipped applicators (Q-tips)    *Vaseline, Aquaphor, Polysporin or Bacitracin Ointment (NOT NEOSPORIN)    *Band-aids or non-stick gauze pads and micropore paper tape.      PATIENT INFORMATION:    During the healing process you will notice a number of changes. All wounds develop a small halo of redness surrounding the wound.  This means healing is occurring. Severe itching with extensive redness usually indicates sensitivity to the ointment or bandage tape used to dress the wound.  You should call our office if this develops.      Swelling  and/or discoloration around your surgical site is common, particularly when performed around the eye.    All wounds normally drain.  The larger the wound the more drainage there will be.  After 7-10 days, you will notice the wound beginning to shrink and new skin will begin to grow.  The wound is healed when you can see skin has formed over the entire area.  A healed  wound has a healthy, shiny look to the surface and is red to dark pink in color to normalize.  Wounds may take approximately 4-6 weeks to heal.  Larger wounds may take 6-8 weeks.  After the wound is healed you may discontinue dressing changes.    You may experience a sensation of tightness as your wound heals. This is normal and will gradually subside.    Your healed wound may be sensitive to temperature changes. This sensitivity improves with time, but if you re having a lot of discomfort, try to avoid temperature extremes.    Patients frequently experience itching after their wound appears to have healed because of the continue healing under the skin.  Plain Vaseline will help relieve the itching.      POSSIBLE COMPLICATIONS    BLEEDING:    Leave the bandage in place.  Use tightly rolled up gauze or a cloth to apply direct pressure over the bandage for 30  minutes.  Reapply pressure for an additional 30 minutes if necessary  Use additional gauze and tape to maintain pressure once the bleeding has stopped.

## 2023-07-23 LAB
PATH REPORT.COMMENTS IMP SPEC: NORMAL
PATH REPORT.FINAL DX SPEC: NORMAL
PATH REPORT.GROSS SPEC: NORMAL
PATH REPORT.MICROSCOPIC SPEC OTHER STN: NORMAL
PATH REPORT.RELEVANT HX SPEC: NORMAL

## 2023-07-24 ENCOUNTER — TELEPHONE (OUTPATIENT)
Dept: DERMATOLOGY | Facility: CLINIC | Age: 43
End: 2023-07-24
Payer: COMMERCIAL

## 2023-07-24 NOTE — TELEPHONE ENCOUNTER
----- Message from Kristi Yan PA-C sent at 7/23/2023  3:15 PM CDT -----  Left chest atypical junctional melanocytic hyperplasia - very atypical mole schedule excision. She should do yearly FSE.

## 2023-07-24 NOTE — LETTER
[facility]  [address]  [place] [address]  [phone]      July 24, 2023    Shobha Hiarston  [address] [place] [address]      Dear Shobha      You are scheduled for Mohs Surgery on Tuesday August 1, 2023 at 7:30 am.     Please check in at 2nd floor Dermatology Clinic.     Be sure to eat a good breakfast and bathe and wash your hair prior to Surgery. Please bring  with you if this is above your neck    If you are taking any anti-coagulants that are prescribed by your Doctor (such as Coumadin/warfarin, Plavix, Aspirin, Ibuprofen), please continue taking them.     However, If you are taking anti-coagulants over the counter without  a Doctor's order for a Medical condition, please discontinue them 10 days prior to Surgery.      Please wear loose comfortable clothing as it could possibly be 4-6 hours until your surgery is completed depending upon how many layers of tissue need to be removed.     Thank you,    Los Bundy MD

## 2023-07-24 NOTE — TELEPHONE ENCOUNTER
S/w pt and gave Kristi's reply below about biopsy results.  Explained the mohs procedure and answered all questions.  Pt scheduled for mohs on Tuesday 8/1 at 7:30 am at [facility].  Pt states understanding.    Mohs letter and information sent via my chart and mailed home.    Sarah SPICER RN  [facility] [place]  [phone]

## 2023-07-28 ENCOUNTER — ANCILLARY PROCEDURE (OUTPATIENT)
Dept: GENERAL RADIOLOGY | Facility: CLINIC | Age: 43
End: 2023-07-28
Attending: FAMILY MEDICINE
Payer: COMMERCIAL

## 2023-07-28 ENCOUNTER — LAB (OUTPATIENT)
Dept: LAB | Facility: CLINIC | Age: 43
End: 2023-07-28
Payer: COMMERCIAL

## 2023-07-28 ENCOUNTER — OFFICE VISIT (OUTPATIENT)
Dept: FAMILY MEDICINE | Facility: CLINIC | Age: 43
End: 2023-07-28
Payer: COMMERCIAL

## 2023-07-28 VITALS
HEART RATE: 92 BPM | RESPIRATION RATE: 16 BRPM | HEIGHT: 64 IN | BODY MASS INDEX: 22.02 KG/M2 | DIASTOLIC BLOOD PRESSURE: 64 MMHG | WEIGHT: 129 LBS | OXYGEN SATURATION: 98 % | SYSTOLIC BLOOD PRESSURE: 118 MMHG | TEMPERATURE: 97.5 F

## 2023-07-28 DIAGNOSIS — R07.9 CHEST PAIN, UNSPECIFIED TYPE: ICD-10-CM

## 2023-07-28 DIAGNOSIS — N89.8 VAGINAL DISCHARGE: ICD-10-CM

## 2023-07-28 DIAGNOSIS — Z12.31 ENCOUNTER FOR SCREENING MAMMOGRAM FOR BREAST CANCER: ICD-10-CM

## 2023-07-28 DIAGNOSIS — M25.50 ARTHRALGIA, UNSPECIFIED JOINT: ICD-10-CM

## 2023-07-28 DIAGNOSIS — J45.20 MILD INTERMITTENT ASTHMA WITHOUT COMPLICATION: ICD-10-CM

## 2023-07-28 DIAGNOSIS — Z00.00 ROUTINE GENERAL MEDICAL EXAMINATION AT A HEALTH CARE FACILITY: Primary | ICD-10-CM

## 2023-07-28 DIAGNOSIS — R32 URINARY INCONTINENCE, UNSPECIFIED TYPE: ICD-10-CM

## 2023-07-28 DIAGNOSIS — Z12.4 CERVICAL CANCER SCREENING: ICD-10-CM

## 2023-07-28 LAB
ALBUMIN SERPL BCG-MCNC: 4.7 G/DL (ref 3.5–5.2)
ALP SERPL-CCNC: 49 U/L (ref 35–104)
ALT SERPL W P-5'-P-CCNC: 8 U/L (ref 0–50)
ANION GAP SERPL CALCULATED.3IONS-SCNC: 11 MMOL/L (ref 7–15)
AST SERPL W P-5'-P-CCNC: 17 U/L (ref 0–45)
BASOPHILS # BLD AUTO: 0.1 10E3/UL (ref 0–0.2)
BASOPHILS NFR BLD AUTO: 1 %
BILIRUB SERPL-MCNC: 0.2 MG/DL
BUN SERPL-MCNC: 7.6 MG/DL (ref 6–20)
CALCIUM SERPL-MCNC: 9.3 MG/DL (ref 8.6–10)
CHLORIDE SERPL-SCNC: 102 MMOL/L (ref 98–107)
CLUE CELLS: NORMAL
CREAT SERPL-MCNC: 0.61 MG/DL (ref 0.51–0.95)
CRP SERPL-MCNC: <3 MG/L
DEPRECATED HCO3 PLAS-SCNC: 25 MMOL/L (ref 22–29)
EOSINOPHIL # BLD AUTO: 0.1 10E3/UL (ref 0–0.7)
EOSINOPHIL NFR BLD AUTO: 1 %
ERYTHROCYTE [DISTWIDTH] IN BLOOD BY AUTOMATED COUNT: 13 % (ref 10–15)
ERYTHROCYTE [SEDIMENTATION RATE] IN BLOOD BY WESTERGREN METHOD: 18 MM/HR (ref 0–20)
GFR SERPL CREATININE-BSD FRML MDRD: >90 ML/MIN/1.73M2
GLUCOSE SERPL-MCNC: 84 MG/DL (ref 70–99)
HCT VFR BLD AUTO: 34.1 % (ref 35–47)
HGB BLD-MCNC: 11.4 G/DL (ref 11.7–15.7)
IMM GRANULOCYTES # BLD: 0 10E3/UL
IMM GRANULOCYTES NFR BLD: 0 %
LYMPHOCYTES # BLD AUTO: 3.1 10E3/UL (ref 0.8–5.3)
LYMPHOCYTES NFR BLD AUTO: 37 %
MCH RBC QN AUTO: 30.4 PG (ref 26.5–33)
MCHC RBC AUTO-ENTMCNC: 33.4 G/DL (ref 31.5–36.5)
MCV RBC AUTO: 91 FL (ref 78–100)
MONOCYTES # BLD AUTO: 0.6 10E3/UL (ref 0–1.3)
MONOCYTES NFR BLD AUTO: 8 %
NEUTROPHILS # BLD AUTO: 4.4 10E3/UL (ref 1.6–8.3)
NEUTROPHILS NFR BLD AUTO: 53 %
PLATELET # BLD AUTO: 317 10E3/UL (ref 150–450)
POTASSIUM SERPL-SCNC: 4 MMOL/L (ref 3.4–5.3)
PROT SERPL-MCNC: 7.6 G/DL (ref 6.4–8.3)
RBC # BLD AUTO: 3.75 10E6/UL (ref 3.8–5.2)
SODIUM SERPL-SCNC: 138 MMOL/L (ref 136–145)
TRICHOMONAS, WET PREP: NORMAL
TSH SERPL DL<=0.005 MIU/L-ACNC: 1.51 UIU/ML (ref 0.3–4.2)
WBC # BLD AUTO: 8.3 10E3/UL (ref 4–11)
WBC'S/HIGH POWER FIELD, WET PREP: NORMAL
YEAST, WET PREP: NORMAL

## 2023-07-28 PROCEDURE — 36415 COLL VENOUS BLD VENIPUNCTURE: CPT

## 2023-07-28 PROCEDURE — 80053 COMPREHEN METABOLIC PANEL: CPT

## 2023-07-28 PROCEDURE — 85025 COMPLETE CBC W/AUTO DIFF WBC: CPT

## 2023-07-28 PROCEDURE — 99396 PREV VISIT EST AGE 40-64: CPT | Mod: 25 | Performed by: FAMILY MEDICINE

## 2023-07-28 PROCEDURE — 87210 SMEAR WET MOUNT SALINE/INK: CPT | Performed by: FAMILY MEDICINE

## 2023-07-28 PROCEDURE — 90715 TDAP VACCINE 7 YRS/> IM: CPT | Performed by: FAMILY MEDICINE

## 2023-07-28 PROCEDURE — G0145 SCR C/V CYTO,THINLAYER,RESCR: HCPCS | Performed by: FAMILY MEDICINE

## 2023-07-28 PROCEDURE — 99214 OFFICE O/P EST MOD 30 MIN: CPT | Mod: 25 | Performed by: FAMILY MEDICINE

## 2023-07-28 PROCEDURE — 87624 HPV HI-RISK TYP POOLED RSLT: CPT | Performed by: FAMILY MEDICINE

## 2023-07-28 PROCEDURE — 73070 X-RAY EXAM OF ELBOW: CPT | Mod: TC | Performed by: RADIOLOGY

## 2023-07-28 PROCEDURE — 86038 ANTINUCLEAR ANTIBODIES: CPT

## 2023-07-28 PROCEDURE — 86140 C-REACTIVE PROTEIN: CPT

## 2023-07-28 PROCEDURE — 86039 ANTINUCLEAR ANTIBODIES (ANA): CPT

## 2023-07-28 PROCEDURE — 84443 ASSAY THYROID STIM HORMONE: CPT

## 2023-07-28 PROCEDURE — 85652 RBC SED RATE AUTOMATED: CPT

## 2023-07-28 PROCEDURE — 90677 PCV20 VACCINE IM: CPT | Performed by: FAMILY MEDICINE

## 2023-07-28 PROCEDURE — G0124 SCREEN C/V THIN LAYER BY MD: HCPCS | Performed by: PATHOLOGY

## 2023-07-28 PROCEDURE — 86200 CCP ANTIBODY: CPT

## 2023-07-28 PROCEDURE — 90472 IMMUNIZATION ADMIN EACH ADD: CPT | Performed by: FAMILY MEDICINE

## 2023-07-28 PROCEDURE — 90471 IMMUNIZATION ADMIN: CPT | Performed by: FAMILY MEDICINE

## 2023-07-28 PROCEDURE — 86431 RHEUMATOID FACTOR QUANT: CPT

## 2023-07-28 PROCEDURE — 93000 ELECTROCARDIOGRAM COMPLETE: CPT | Performed by: FAMILY MEDICINE

## 2023-07-28 RX ORDER — ALBUTEROL SULFATE 90 UG/1
2 AEROSOL, METERED RESPIRATORY (INHALATION) EVERY 6 HOURS PRN
Qty: 18 G | Refills: 0 | Status: SHIPPED | OUTPATIENT
Start: 2023-07-28 | End: 2023-12-28

## 2023-07-28 ASSESSMENT — ENCOUNTER SYMPTOMS
ABDOMINAL PAIN: 0
HEARTBURN: 0
NAUSEA: 0
SHORTNESS OF BREATH: 0
HEMATURIA: 0
PARESTHESIAS: 0
FREQUENCY: 1
ARTHRALGIAS: 1
CONSTIPATION: 0
EYE PAIN: 0
DYSURIA: 0
COUGH: 0
NERVOUS/ANXIOUS: 0
MYALGIAS: 1
JOINT SWELLING: 1
DIZZINESS: 0
HEADACHES: 0
DIARRHEA: 0
SORE THROAT: 0
HEMATOCHEZIA: 0
FEVER: 0
WEAKNESS: 1
PALPITATIONS: 0
CHILLS: 0

## 2023-07-28 ASSESSMENT — ASTHMA QUESTIONNAIRES: ACT_TOTALSCORE: 17

## 2023-07-28 NOTE — PATIENT INSTRUCTIONS
Zyrtec daily for allergies-this is non drowsy     Preventive Health Recommendations  Female Ages 40 to 49    Yearly exam:   See your health care provider every year in order to  Review health changes.   Discuss preventive care.    Review your medicines if your doctor prescribed any.    Get a Pap test every three years (unless you have an abnormal result and your provider advises testing more often).    If you get Pap tests with HPV test, you only need to test every 5 years, unless you have an abnormal result. You do not need a Pap test if your uterus was removed (hysterectomy) and you have not had cancer.    You should be tested each year for STDs (sexually transmitted diseases), if you're at risk.   Ask your doctor if you should have a mammogram.    Have a colonoscopy (test for colon cancer) if someone in your family has had colon cancer or polyps before age 50.     Have a cholesterol test every 5 years.     Have a diabetes test (fasting glucose) after age 45. If you are at risk for diabetes, you should have this test every 3 years.    Shots: Get a flu shot each year. Get a tetanus shot every 10 years.     Nutrition:   Eat at least 5 servings of fruits and vegetables each day.  Eat whole-grain bread, whole-wheat pasta and brown rice instead of white grains and rice.  Get adequate Calcium and Vitamin D.      Lifestyle  Exercise at least 150 minutes a week (an average of 30 minutes a day, 5 days a week). This will help you control your weight and prevent disease.  Limit alcohol to one drink per day.  No smoking.   Wear sunscreen to prevent skin cancer.  See your dentist every six months for an exam and cleaning.

## 2023-07-28 NOTE — PROGRESS NOTES
SUBJECTIVE:   CC: Shobha is an 43 year old who presents for preventive health visit.       7/28/2023    11:05 AM   Additional Questions   Roomed by Lisa SOMMERS       Healthy Habits:     Getting at least 3 servings of Calcium per day:  Yes    Bi-annual eye exam:  Yes    Dental care twice a year:  Yes    Sleep apnea or symptoms of sleep apnea:  Daytime drowsiness    Diet:  Regular (no restrictions)    Frequency of exercise:  1 day/week    Duration of exercise:  15-30 minutes    Taking medications regularly:  Yes    Additional concerns today:  Yes    Other concerns-chest cramping but has improved since mole was removed from that area 2 weeks ago.     Remote history of methamphetamine abuse-she has been sober for about a year.    In process of being evaluated for skin cancer with dermatology.    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  She has always wondered if she has underlying autoimmune disease.  Denies family history of autoimmune disease.  Was told she has psoriasis by dermatologist.    Episodes of urinary incontinence, does drink significant caffeine.    Today's PHQ-2 Score:       7/28/2023    11:03 AM   PHQ-2 ( 1999 [employer])   Q1: Little interest or pleasure in doing things 0   Q2: Feeling down, depressed or hopeless 0   PHQ-2 Score 0   Q1: Little interest or pleasure in doing things Not at all   Q2: Feeling down, depressed or hopeless Not at all   PHQ-2 Score 0     Have you ever done Advance Care Planning? (For example, a Health Directive, POLST, or a discussion with a medical provider or your loved ones about your wishes): No, advance care planning information given to patient to review.  Patient declined advance care planning discussion at this time.    Social History     Tobacco Use    Smoking status: Every Day     Packs/day: 0.50     Years: 23.00     Pack years: 11.50     Types: Cigarettes    Smokeless tobacco: Never   Substance Use Topics    Alcohol use: Not  Currently         2023    11:03 AM   Alcohol Use   Prescreen: >3 drinks/day or >7 drinks/week? No     Reviewed orders with patient.  Reviewed health maintenance and updated orders accordingly - Yes  Lab work is in process    Breast Cancer Screenin/28/2023    11:04 AM   Breast CA Risk Assessment (FHS-7)   Do you have a family history of breast, colon, or ovarian cancer? No / Unknown       click delete button to remove this line now  Mammogram Screening - Offered annual screening and updated Health Maintenance for mutual plan based on risk factor consideration  Pertinent mammograms are reviewed under the imaging tab.    History of abnormal Pap smear: NO - age 30-65 PAP every 5 years with negative HPV co-testing recommended      2016    10:40 AM 2014     3:26 PM   PAP / HPV   PAP Atypical squamous cells of undetermined significance  Electronically signed by Yesenia Parsons MD on 2016 at  3:06 PM    Negative for squamous intraepithelial lesion or malignancy  Electronically signed by Clotilde Mota CT (ASCP) on 2014 at 11:58 AM        Reviewed and updated as needed this visit by clinical staff   Tobacco  Allergies  Meds    Surg Hx           Reviewed and updated as needed this visit by Provider   Tobacco      Surg Hx          Past Medical History:   Diagnosis Date    Alcohol abuse     Created by Conversion  Replacement Utility updated for latest IMO load    Allergic rhinitis     Created by Conversion  Replacement Utility updated for latest IMO load    Attention deficit disorder with hyperactivity(314.01)     Created by Conversion     Bipolar I disorder, single manic episode (H)     Created by Conversion  Replacement Utility updated for latest IMO load    Calculus of kidney     Created by Conversion [facility] Annotation: Sep 28 2007  4:38PM - Vanessa Garner: '02, '03     History of methamphetamine abuse (H)     Mild intermittent asthma without complication     Created by  "Conversion     Other abnormal Papanicolaou smear of cervix and cervical HPV(795.09)     Created by Conversion     Overweight (BMI 25.0-29.9)     Created by Conversion     Pyelonephritis     Created by Conversion  Replacement Utility updated for latest IMO load    Skin cancer     Tobacco abuse 12/21/2015      Past Surgical History:   Procedure Laterality Date    HC FRAGMENTING OF KIDNEY STONE      Description: Lithotripsy;  Recorded: 09/28/2007;    ZZC REPR VSD      Description: VSD Repair Single;  Recorded: 09/28/2007;       Review of Systems   Constitutional:  Negative for chills and fever.   HENT:  Negative for congestion, ear pain, hearing loss and sore throat.    Eyes:  Negative for pain and visual disturbance.   Respiratory:  Negative for cough and shortness of breath.    Cardiovascular:  Positive for chest pain. Negative for palpitations and peripheral edema.   Gastrointestinal:  Negative for abdominal pain, constipation, diarrhea, heartburn, hematochezia and nausea.   Genitourinary:  Positive for frequency and urgency. Negative for dysuria, genital sores and hematuria.   Musculoskeletal:  Positive for arthralgias, joint swelling and myalgias.   Skin:  Negative for rash.   Neurological:  Positive for weakness. Negative for dizziness, headaches and paresthesias.   Psychiatric/Behavioral:  Negative for mood changes. The patient is not nervous/anxious.           OBJECTIVE:   /64   Pulse 92   Temp 97.5  F (36.4  C) (Tympanic)   Resp 16   Ht 1.613 m (5' 3.5\")   Wt 58.5 kg (129 lb)   LMP 07/19/2023 (Approximate)   SpO2 98%   BMI 22.49 kg/m    Physical Exam  Constitutional:       Appearance: Normal appearance.   HENT:      Head: Normocephalic.      Nose: Nose normal.   Eyes:      Conjunctiva/sclera: Conjunctivae normal.   Cardiovascular:      Rate and Rhythm: Normal rate and regular rhythm.      Pulses: Normal pulses.   Pulmonary:      Effort: Pulmonary effort is normal.      Breath sounds: Normal " breath sounds.   Abdominal:      General: Abdomen is flat. Bowel sounds are normal.   Musculoskeletal:      Right lower leg: No edema.      Left lower leg: No edema.   Skin:     General: Skin is warm and dry.   Neurological:      Mental Status: She is alert.   Psychiatric:         Thought Content: Thought content normal.         Judgment: Judgment normal.         ASSESSMENT/PLAN:   Shobha was seen today for physical.    Diagnoses and all orders for this visit:    Routine general medical examination at a health care facility    Cervical cancer screening  -     Pap Screen with HPV - recommended age 30 - 65 years  -     HPV Hold (Lab Only)    Chest pain, unspecified type  Does not appear to be anginal however with significant history of methamphetamine abuse we will plan to obtain echocardiogram and stress test.  EKG is reassuring  -     Echocardiogram Exercise Stress; Future  -     Comprehensive metabolic panel (BMP + Alb, Alk Phos, ALT, AST, Total. Bili, TP); Future  -     CBC with platelets and differential; Future  -     TSH with free T4 reflex; Future  -     EKG 12-lead complete w/read - Clinics    Arthralgia, unspecified joint  No signs of synovitis on exam, did discuss question of OA versus tendinopathy as she is very active in her job is very physical.   -     ESR: Erythrocyte sedimentation rate; Future  -     CRP, inflammation; Future  -     Anti Nuclear Dina IgG by IFA with Reflex; Future  -     Rheumatoid factor; Future  -     Cyclic Citrullinated Peptide Antibody IgG; Future  -     Adult Rheumatology  Referral; Future  -     XR Elbow Right 2 Views; Future    Encounter for screening mammogram for breast cancer  -     *MA Screening Digital Bilateral; Future    Vaginal discharge  -     Wet prep - Clinic Collect    Mild intermittent asthma without complication  -     albuterol (PROAIR HFA/PROVENTIL HFA/VENTOLIN HFA) 108 (90 Base) MCG/ACT inhaler; Inhale 2 puffs into the lungs every 6 hours as needed for  wheezing  -     beclomethasone HFA (QVAR REDIHALER) 80 MCG/ACT inhaler; Inhale 1 puff into the lungs 2 times daily    Other orders  -     Pneumococcal 20 Valent Conjugate (Prevnar 20)  -     TDAP 10-64Y (ADACEL,BOOSTRIX)    Urinary incontinence, unspecified type  Recommended on regular bathroom breaks and cutting down on caffeine.  Recommended working with pelvic floor PT  - Physical Therapy Referral; Future      Patient has been advised of split billing requirements and indicates understanding: Yes      COUNSELING:  Reviewed preventive health counseling, as reflected in patient instructions  Special attention given to:        Regular exercise       Healthy diet/nutrition       Vision screening       Alcohol Use       Safe sex practices/STD prevention        She reports that she has been smoking cigarettes. She has a 11.50 pack-year smoking history. She has never used smokeless tobacco.  Nicotine/Tobacco Cessation Plan:   Information offered: Patient not interested at this time          DO TOOTIE SNYDER [facility]

## 2023-07-28 NOTE — LETTER
My Asthma Action Plan    Name: Shobha Hairston   YOB: 1980  Date: 7/28/2023   My doctor: ZENAIDA LYONS, DO   My clinic: [facility]        My Rescue Medicine:   Albuterol inhaler (Proair/Ventolin/Proventil HFA)  2-4 puffs EVERY 4 HOURS as needed. Use a spacer if recommended by your provider.   My Asthma Severity:   Intermittent / Exercise Induced  Know your asthma triggers:   stress and qitting smoking           GREEN ZONE   Good Control  I feel good  No cough or wheeze  Can work, sleep and play without asthma symptoms       Take your asthma control medicine every day.     If exercise triggers your asthma, take your rescue medication  15 minutes before exercise or sports, and  During exercise if you have asthma symptoms  Spacer to use with inhaler: If you have a spacer, make sure to use it with your inhaler             YELLOW ZONE Getting Worse  I have ANY of these:  I do not feel good  Cough or wheeze  Chest feels tight  Wake up at night   Keep taking your Green Zone medications  Start taking your rescue medicine:  every 20 minutes for up to 1 hour. Then every 4 hours for 24-48 hours.  If you stay in the Yellow Zone for more than 12-24 hours, contact your doctor.  If you do not return to the Green Zone in 12-24 hours or you get worse, start taking your oral steroid medicine if prescribed by your provider.           RED ZONE Medical Alert - Get Help  I have ANY of these:  I feel awful  Medicine is not helping  Breathing getting harder  Trouble walking or talking  Nose opens wide to breathe       Take your rescue medicine NOW  If your provider has prescribed an oral steroid medicine, start taking it NOW  Call your doctor NOW  If you are still in the Red Zone after 20 minutes and you have not reached your doctor:  Take your rescue medicine again and  Call 911 or go to the emergency room right away    See your regular doctor within 2 weeks of an Emergency Room or Urgent  Care visit for follow-up treatment.          Annual Reminders:  Meet with Asthma Educator,  Flu Shot in the Fall, consider Pneumonia Vaccination for patients with asthma (aged 19 and older).    Pharmacy: [facility] [address] [place] [address] E    Electronically signed by ZENAIDA LYONS DO   Date: 07/28/23                    Asthma Triggers  How To Control Things That Make Your Asthma Worse    Triggers are things that make your asthma worse.  Look at the list below to help you find your triggers and   what you can do about them. You can help prevent asthma flare-ups by staying away from your triggers.      Trigger                                                          What you can do   Cigarette Smoke  Tobacco smoke can make asthma worse. Do not allow smoking in your home, car or around you.  Be sure no one smokes at a child s day care or school.  If you smoke, ask your health care provider for ways to help you quit.  Ask family members to quit too.  Ask your health care provider for a referral to Quit Plan to help you quit smoking, or call 4-516-927-PLAN.     Colds, Flu, Bronchitis  These are common triggers of asthma. Wash your hands often.  Don t touch your eyes, nose or mouth.  Get a flu shot every year.     Dust Mites  These are tiny bugs that live in cloth or carpet. They are too small to see. Wash sheets and blankets in hot water every week.   Encase pillows and mattress in dust mite proof covers.  Avoid having carpet if you can. If you have carpet, vacuum weekly.   Use a dust mask and HEPA vacuum.   Pollen and Outdoor Mold  Some people are allergic to trees, grass, or weed pollen, or molds. Try to keep your windows closed.  Limit time out doors when pollen count is high.   Ask you health care provider about taking medicine during allergy season.     Animal Dander  Some people are allergic to skin flakes, urine or saliva from pets with fur or feathers. Keep pets with fur or  feathers out of your home.    If you can t keep the pet outdoors, then keep the pet out of your bedroom.  Keep the bedroom door closed.  Keep pets off cloth furniture and away from stuffed toys.     Mice, Rats, and Cockroaches  Some people are allergic to the waste from these pests.   Cover food and garbage.  Clean up spills and food crumbs.  Store grease in the refrigerator.   Keep food out of the bedroom.   Indoor Mold  This can be a trigger if your home has high moisture. Fix leaking faucets, pipes, or other sources of water.   Clean moldy surfaces.  Dehumidify basement if it is damp and smelly.   Smoke, Strong Odors, and Sprays  These can reduce air quality. Stay away from strong odors and sprays, such as perfume, powder, hair spray, paints, smoke incense, paint, cleaning products, candles and new carpet.   Exercise or Sports  Some people with asthma have this trigger. Be active!  Ask your doctor about taking medicine before sports or exercise to prevent symptoms.    Warm up for 5-10 minutes before and after sports or exercise.     Other Triggers of Asthma  Cold air:  Cover your nose and mouth with a scarf.  Sometimes laughing or crying can be a trigger.  Some medicines and food can trigger asthma.

## 2023-07-29 ENCOUNTER — TELEPHONE (OUTPATIENT)
Dept: FAMILY MEDICINE | Facility: CLINIC | Age: 43
End: 2023-07-29
Payer: COMMERCIAL

## 2023-07-29 DIAGNOSIS — J45.20 MILD INTERMITTENT ASTHMA WITHOUT COMPLICATION: Primary | ICD-10-CM

## 2023-07-31 LAB
ANA PAT SER IF-IMP: ABNORMAL
ANA SER QL IF: ABNORMAL
ANA TITR SER IF: ABNORMAL {TITER}
CCP AB SER IA-ACNC: 5.8 U/ML
RHEUMATOID FACT SER NEPH-ACNC: 6 IU/ML

## 2023-08-01 ENCOUNTER — OFFICE VISIT (OUTPATIENT)
Dept: DERMATOLOGY | Facility: CLINIC | Age: 43
End: 2023-08-01
Payer: COMMERCIAL

## 2023-08-01 DIAGNOSIS — L82.1 SEBORRHEIC KERATOSES: ICD-10-CM

## 2023-08-01 DIAGNOSIS — D18.01 ANGIOMA OF SKIN: ICD-10-CM

## 2023-08-01 DIAGNOSIS — L81.4 LENTIGO: ICD-10-CM

## 2023-08-01 DIAGNOSIS — D03.59 MALIGNANT MELANOMA IN SITU OF SKIN OF ANTERIOR CHEST (H): Primary | ICD-10-CM

## 2023-08-01 PROCEDURE — 17313 MOHS 1 STAGE T/A/L: CPT | Performed by: DERMATOLOGY

## 2023-08-01 PROCEDURE — 88342 IMHCHEM/IMCYTCHM 1ST ANTB: CPT | Mod: 59 | Performed by: DERMATOLOGY

## 2023-08-01 PROCEDURE — 99213 OFFICE O/P EST LOW 20 MIN: CPT | Mod: 25 | Performed by: DERMATOLOGY

## 2023-08-01 ASSESSMENT — PAIN SCALES - GENERAL: PAINLEVEL: NO PAIN (0)

## 2023-08-01 NOTE — PATIENT INSTRUCTIONS
Open Wound Care     for ____chest__________        No strenuous activity for 48 hours    Take Tylenol as needed for discomfort.                                                .         Do not drink alcoholic beverages for 48 hours.    Keep the pressure bandage in place for 24 hours. If the bandage becomes blood tinged or loose, reinforce it with gauze and tape.        (Refer to the reverse side of this page for management of bleeding).    Remove bandage in 24 hours and begin wound care as follows:     Clean area with tap water using a Q tip or gauze pad, (shower / bathe normally)  Dry wound with Q tip or gauze pad  Apply Aquaphor, Vaseline, Polysporin or Bacitracin Ointment with a Q tip  Do NOT use Neosporin Ointment *  Cover the wound with a band-aid or nonstick gauze pad and paper tape.  Repeat wound care once a day until wound is completely healed.    It is an old wives tale that a wound heals better when it is exposed to air and allowed to dry out. The wound will heal faster with a better cosmetic result if it is kept moist with ointment and covered with a bandage.  Do not let the wound dry out.      Supplies Needed:                Qtips or gauze pads                Polysporin or Bacitracin Ointment                Bandaids or nonstick gauze pads and paper tape    Wound care kits and brown paper tape are available for purchase at   the pharmacy.    BLEEDING:    Use tightly rolled up gauze or cloth to apply direct pressure over the bandage for 20   minutes.  Reapply pressure for an additional 20 minutes if necessary  Call the office or go to the nearest emergency room if pressure fails to stop the bleeding.  Use additional gauze and tape to maintain pressure once the bleeding has stopped.  Begin wound care 24 hours after surgery as directed.                  WOUND HEALING    One week after surgery a pink / red halo will form around the outside of the wound.   This is new skin.  The center of the wound will appear  yellowish white and produce some drainage.  The pink halo will slowly migrate in toward the center of the wound until the wound is covered with new shiny pink skin.  There will be no more drainage when the wound is completely healed.  It will take six months to one year for the redness to fade.  The scar may be itchy, tight and sensitive to extreme temperatures for a year after the surgery.  Massaging the area several times a day for several minutes after the wound is completely healed will help the scar soften and normalize faster. Begin massage only after healing is complete.      In case of emergency call: Dr Bundy: [phone]    [facility]: [phone]    [facility]:[phone]

## 2023-08-01 NOTE — TELEPHONE ENCOUNTER
Dr. Moscoso  PA for beclomethasone diprop has been denied.    Formulary alternatives include:      Mikala Mon

## 2023-08-01 NOTE — TELEPHONE ENCOUNTER
PRIOR AUTHORIZATION DENIED    Medication: BECLOMETHASONE DIPROP HFA 80 MCG/ACT IN AERB  Insurance Company: [employer] - Phone [phone] Fax [phone]  Denial Date: 7/28/2023  Denial Rational:     Appeal Information:     Patient Notified: No

## 2023-08-01 NOTE — PROGRESS NOTES
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 She notes mole on left abdomen today.   Patient has no other skin complaints today.  Remainder of the HPI, Meds, PMH, Allergies, FH, and SH was reviewed in chart.      Past Medical History:   Diagnosis Date    Alcohol abuse     Created by Conversion  Replacement Utility updated for latest IMO load    Allergic rhinitis     Created by Conversion  Replacement Utility updated for latest IMO load    Attention deficit disorder with hyperactivity(314.01)     Created by Conversion     Bipolar I disorder, single manic episode (H)     Created by Conversion  Replacement Utility updated for latest IMO load    Calculus of kidney     Created by Conversion [facility] Annotation: Sep 28 2007  4:38PM - Vanessa Garner: '02, '03     History of methamphetamine abuse (H)     Mild intermittent asthma without complication     Created by Conversion     Other abnormal Papanicolaou smear of cervix and cervical HPV(795.09)     Created by Conversion     Overweight (BMI 25.0-29.9)     Created by Conversion     Pyelonephritis     Created by Conversion  Replacement Utility updated for latest IMO load    Tobacco abuse 12/21/2015       Past Surgical History:   Procedure Laterality Date    HC FRAGMENTING OF KIDNEY STONE      Description: Lithotripsy;  Recorded: 09/28/2007;    ZZC REPR VSD      Description: VSD Repair Single;  Recorded: 09/28/2007;        Family History   Problem Relation Age of Onset    Coronary Artery Disease Mother     Thyroid Disease Mother     Diabetes Type 2  Mother     Hypertension Mother        Social History     Socioeconomic History    Marital status: Single     Spouse name: Not on file    Number of children: 1    Years of education: Not on file    Highest education level: Not on file   Occupational History    Not on file   Tobacco Use    Smoking status: Every Day      Packs/day: 0.50     Years: 23.00     Pack years: 11.50     Types: Cigarettes    Smokeless tobacco: Never   Vaping Use    Vaping Use: Every day   Substance and Sexual Activity    Alcohol use: Not Currently    Drug use: No     Types: Marijuana     Comment: Drug use: past use    Sexual activity: Yes     Partners: Male     Birth control/protection: None   Other Topics Concern    Not on file   Social History Narrative    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 [place]      Social Determinants of Health     Financial Resource Strain: Not on file   Food Insecurity: Not on file   Transportation Needs: Not on file   Physical Activity: Not on file   Stress: Not on file   Social Connections: Not on file   Intimate Partner Violence: Not on file   Housing Stability: Not on file       Outpatient Encounter Medications as of 8/1/2023   Medication Sig Dispense Refill    albuterol (PROAIR HFA/PROVENTIL HFA/VENTOLIN HFA) 108 (90 Base) MCG/ACT inhaler Inhale 2 puffs into the lungs every 6 hours as needed for wheezing 18 g 0    beclomethasone (QVAR) 40 mcg/actuation inhaler [BECLOMETHASONE (QVAR) 40 MCG/ACTUATION INHALER] Inhale 2 puffs 2 (two) times a day. (Patient not taking: Reported on 7/28/2023) 1 Inhaler 2    beclomethasone HFA (QVAR REDIHALER) 80 MCG/ACT inhaler Inhale 1 puff into the lungs 2 times daily 10.6 g 3    clobetasol (TEMOVATE) 0.05 % external cream Apply to AA BID x 1-2 weeks then PRN. Do not apply to face. 60 g 3    econazole nitrate 1 % external cream Apply topically daily 85 g 1    fluticasone (FLONASE) 50 mcg/actuation nasal spray [FLUTICASONE (FLONASE) 50 MCG/ACTUATION NASAL SPRAY] USE ONE TO TWO SPRAY(S) IN EACH NOSTRIL ONCE DAILY (Patient not taking: Reported on 7/28/2023) 16 g 2    ibuprofen (ADVIL,MOTRIN) 600 MG tablet [IBUPROFEN (ADVIL,MOTRIN) 600 MG TABLET] Take 1 tablet (600 mg total) by mouth every 6 (six) hours as needed for pain. 60 tablet 1    indomethacin (INDOCIN) 50 MG capsule Take 1 capsule (50 mg) by mouth  2 times daily (with meals) 30 capsule 0    [DISCONTINUED] albuterol (PROAIR HFA/PROVENTIL HFA/VENTOLIN HFA) 108 (90 Base) MCG/ACT inhaler Inhale 2 puffs into the lungs every 6 hours as needed for wheezing (Patient not taking: Reported on 7/28/2023) 18 g 0     No facility-administered encounter medications on file as of 8/1/2023.             O:   NAD, WDWN, Alert & Oriented, Mood & Affect wnl, Vitals stable   Here today alone   General appearance normal   Vitals stable   Alert, oriented and in no acute distress      Following lymph nodes palpated: Supraclavicular no lad   L chest 3mm red macule  Stuck on papules and brown macules on trunk and ext   L abdomen red papule       Eyes: Conjunctivae/lids:Normal     ENT: Lips, buccal mucosa, tongue: normal    MSK:Normal    Cardiovascular: peripheral edema none    Pulm: Breathing Normal    Neuro/Psych: Orientation:Alert and Orientedx3 ; Mood/Affect:normal       A/P:  L chest melanoma in situ   Seborrheic keratosis, lentigo, angioma  Nature of benign lesions discussed with patient   It was a pleasure speaking to Shobha Hairston today.  Previous clinic notes and pertinent laboratory tests were reviewed prior to Shobha Hairston's visit.  Signs and Symptoms of skin cancer discussed with patient.  Patient encouraged to perform monthly skin exams.  UV precautions reviewed with patient.  Return to clinic 6 months    PROCEDURE NOTE  L chest melanoma in situ   MELANOMA DISCUSSED WITH PATIENT:  I discussed the specifics of tumor, prognosis, metachronous melanoma, self exam, and genetics with the patient. I explained the need for monthly skin exams including and taught the patient how to do this. Patient was asked about new or changing moles . I discussed with patient signs and symptoms that could arise in the setting of recurrent locoregional or metastatic disease. In addition, the need to undergo every 6 month dermatologic full skin survey and evaluation given that patients with a  diagnosis of melanoma are at risk of recurrence (local and distant) and of subsequent de amado melanoma.  . I reviewed treatment options, including a discussion of wide excision (the gold standard) versus Mohs surgery with MART-1 immunostains.     Note: MART-1 (Melanoma Antigen Recognized by T-cells) antibody immunostaining was used during Mohs surgery as per standard protocol, in addition to routine processing of all specimens with hematoxylin and eosin. The peripheral margins/edges were processed with the MART-1 stain (1 specimens total). The center was examined with hematoxylin & eosin and MART-1 immunostains. The patient was informed of the procedure and its risk/benefits during the consent for the procedure.    One or more of the reagents used in immunohistochemical testing in this case may not have been cleared or approved by the U.S. Food and Drug Administration (FDA). The FDA has determined that such clearance or approval is not necessary. These tests are used for clinical purposes. They should not be regarded as investigational or for research. These reagents  performance characteristics have been determined by [facility]. This laboratory is certified under the Clinical Laboratory Improvement Amendments of 1988 (CLIA-88) as qualified to perform high complexity clinical laboratory testing.      MOHS:   Aggressive histology    The rationale for Mohs surgery was discussed with the patient and consent was obtained.  The risks and benefits as well as alternatives to therapy were discussed, in detail.  Specifically, the risks of infection, scarring, bleeding, prolonged wound healing, incomplete removal, allergy to anesthesia, nerve injury and recurrence were addressed.  Indication for Mohs was Aggressive histology. Prior to the procedure, the treatment site was clearly identified and, if available, confirmed with previous photos and confirmed by the patient   All components of the Universal  Protocol/PAUSE rule were completed.  The Mohs surgeon operated in two distinct and integrated capacities as the surgeon and pathologist.      The area was prepped with Betasept.  A rim of normal appearing skin was marked circumferentially around the lesion.  The area was infiltrated with local anesthesia.  The tumor was first debulked to remove all clinically apparent tumor.  An incision following the standard Mohs approach was done and the specimen was oriented,mapped and placed in 1 block(s).  Each specimen was then chromacoded and processed in the Mohs laboratory using standard Mohs technique and submitted for frozen section histology.  Frozen section analysis showed no residual tumor but CLEAR MARGINS.      The tumor was excised using standard Mohs technique in 1 stages(s).  MART 1 stains were performed on 1 specimens. CLEAR MARGINS OBTAINED and Final defect size was 1.3 cm.     We discussed the options for wound management in full with the patient including risks/benefits/ possible outcomes.        REPAIR SECOND INTENT: We discussed the options for wound management in full with the patient including risks/benefits/possible outcomes. Decision made to allow the wound to heal by second intention. Cautery was used for for hemostasis. EBL minimal; complications none; wound care routine.  The patient was discharged in good condition and will return in one month or prn for wound evaluation.

## 2023-08-01 NOTE — LETTER
8/1/2023         RE: Shobha Hairston  [address] [place] [address]        Dear Colleague,    Thank you for referring your patient, Shobha Hairston, to the [facility]. Please see a copy of my visit note below.    Surgical Office Location :   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  Patient has no other skin complaints today.  Remainder of the HPI, Meds, PMH, Allergies, FH, and SH was reviewed in chart.      Past Medical History:   Diagnosis Date     Alcohol abuse     Created by Conversion  Replacement Utility updated for latest IMO load     Allergic rhinitis     Created by Conversion  Replacement Utility updated for latest IMO load     Attention deficit disorder with hyperactivity(314.01)     Created by Conversion      Bipolar I disorder, single manic episode (H)     Created by Conversion  Replacement Utility updated for latest IMO load     Calculus of kidney     Created by Conversion [facility] Annotation: Sep 28 2007  4:38PM - Vanessa Garner: '02, '03      History of methamphetamine abuse (H)      Mild intermittent asthma without complication     Created by Conversion      Other abnormal Papanicolaou smear of cervix and cervical HPV(795.09)     Created by Conversion      Overweight (BMI 25.0-29.9)     Created by Conversion      Pyelonephritis     Created by Conversion  Replacement Utility updated for latest IMO load     Tobacco abuse 12/21/2015       Past Surgical History:   Procedure Laterality Date     HC FRAGMENTING OF KIDNEY STONE      Description: Lithotripsy;  Recorded: 09/28/2007;     ZZC REPR VSD      Description: VSD Repair Single;  Recorded: 09/28/2007;        Family History   Problem Relation Age of Onset     Coronary Artery Disease Mother      Thyroid Disease Mother      Diabetes  Type 2  Mother      Hypertension Mother        Social History     Socioeconomic History     Marital status: Single     Spouse name: Not on file     Number of children: 1     Years of education: Not on file     Highest education level: Not on file   Occupational History     Not on file   Tobacco Use     Smoking status: Every Day     Packs/day: 0.50     Years: 23.00     Pack years: 11.50     Types: Cigarettes     Smokeless tobacco: Never   Vaping Use     Vaping Use: Every day   Substance and Sexual Activity     Alcohol use: Not Currently     Drug use: No     Types: Marijuana     Comment: Drug use: past use     Sexual activity: Yes     Partners: Male     Birth control/protection: None   Other Topics Concern     Not on file   Social History Narrative    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 [place]      Social Determinants of Health     Financial Resource Strain: Not on file   Food Insecurity: Not on file   Transportation Needs: Not on file   Physical Activity: Not on file   Stress: Not on file   Social Connections: Not on file   Intimate Partner Violence: Not on file   Housing Stability: Not on file       Outpatient Encounter Medications as of 8/1/2023   Medication Sig Dispense Refill     albuterol (PROAIR HFA/PROVENTIL HFA/VENTOLIN HFA) 108 (90 Base) MCG/ACT inhaler Inhale 2 puffs into the lungs every 6 hours as needed for wheezing 18 g 0     beclomethasone (QVAR) 40 mcg/actuation inhaler [BECLOMETHASONE (QVAR) 40 MCG/ACTUATION INHALER] Inhale 2 puffs 2 (two) times a day. (Patient not taking: Reported on 7/28/2023) 1 Inhaler 2     beclomethasone HFA (QVAR REDIHALER) 80 MCG/ACT inhaler Inhale 1 puff into the lungs 2 times daily 10.6 g 3     clobetasol (TEMOVATE) 0.05 % external cream Apply to AA BID x 1-2 weeks then PRN. Do not apply to face. 60 g 3     econazole nitrate 1 % external cream Apply topically daily 85 g 1     fluticasone (FLONASE) 50 mcg/actuation nasal spray [FLUTICASONE (FLONASE) 50 MCG/ACTUATION NASAL SPRAY]  USE ONE TO TWO SPRAY(S) IN EACH NOSTRIL ONCE DAILY (Patient not taking: Reported on 7/28/2023) 16 g 2     ibuprofen (ADVIL,MOTRIN) 600 MG tablet [IBUPROFEN (ADVIL,MOTRIN) 600 MG TABLET] Take 1 tablet (600 mg total) by mouth every 6 (six) hours as needed for pain. 60 tablet 1     indomethacin (INDOCIN) 50 MG capsule Take 1 capsule (50 mg) by mouth 2 times daily (with meals) 30 capsule 0     [DISCONTINUED] albuterol (PROAIR HFA/PROVENTIL HFA/VENTOLIN HFA) 108 (90 Base) MCG/ACT inhaler Inhale 2 puffs into the lungs every 6 hours as needed for wheezing (Patient not taking: Reported on 7/28/2023) 18 g 0     No facility-administered encounter medications on file as of 8/1/2023.             O:   NAD, WDWN, Alert & Oriented, Mood & Affect wnl, Vitals stable   Here today alone   General appearance normal   Vitals stable   Alert, oriented and in no acute distress      Following lymph nodes palpated: Supraclavicular no lad   L chest 3mm red macule      Eyes: Conjunctivae/lids:Normal     ENT: Lips, buccal mucosa, tongue: normal    MSK:Normal    Cardiovascular: peripheral edema none    Pulm: Breathing Normal    Neuro/Psych: Orientation:Alert and Orientedx3 ; Mood/Affect:normal       A/P:  L chest melanoma in situ   MELANOMA DISCUSSED WITH PATIENT:  I discussed the specifics of tumor, prognosis, metachronous melanoma, self exam, and genetics with the patient. I explained the need for monthly skin exams including and taught the patient how to do this. Patient was asked about new or changing moles . I discussed with patient signs and symptoms that could arise in the setting of recurrent locoregional or metastatic disease. In addition, the need to undergo every 6 month dermatologic full skin survey and evaluation given that patients with a diagnosis of melanoma are at risk of recurrence (local and distant) and of subsequent de amado melanoma.  . I reviewed treatment options, including a discussion of wide excision (the gold standard)  versus Mohs surgery with MART-1 immunostains.     Note: MART-1 (Melanoma Antigen Recognized by T-cells) antibody immunostaining was used during Mohs surgery as per standard protocol, in addition to routine processing of all specimens with hematoxylin and eosin. The peripheral margins/edges were processed with the MART-1 stain (1 specimens total). The center was examined with hematoxylin & eosin and MART-1 immunostains. The patient was informed of the procedure and its risk/benefits during the consent for the procedure.    One or more of the reagents used in immunohistochemical testing in this case may not have been cleared or approved by the U.S. Food and Drug Administration (FDA). The FDA has determined that such clearance or approval is not necessary. These tests are used for clinical purposes. They should not be regarded as investigational or for research. These reagents  performance characteristics have been determined by [facility]. This laboratory is certified under the Clinical Laboratory Improvement Amendments of 1988 (CLIA-88) as qualified to perform high complexity clinical laboratory testing.      MOHS:   Aggressive histology    The rationale for Mohs surgery was discussed with the patient and consent was obtained.  The risks and benefits as well as alternatives to therapy were discussed, in detail.  Specifically, the risks of infection, scarring, bleeding, prolonged wound healing, incomplete removal, allergy to anesthesia, nerve injury and recurrence were addressed.  Indication for Mohs was Aggressive histology. Prior to the procedure, the treatment site was clearly identified and, if available, confirmed with previous photos and confirmed by the patient   All components of the Universal Protocol/PAUSE rule were completed.  The Mohs surgeon operated in two distinct and integrated capacities as the surgeon and pathologist.      The area was prepped with Betasept.  A rim of normal appearing  skin was marked circumferentially around the lesion.  The area was infiltrated with local anesthesia.  The tumor was first debulked to remove all clinically apparent tumor.  An incision following the standard Mohs approach was done and the specimen was oriented,mapped and placed in 1 block(s).  Each specimen was then chromacoded and processed in the Mohs laboratory using standard Mohs technique and submitted for frozen section histology.  Frozen section analysis showed no residual tumor but CLEAR MARGINS.      The tumor was excised using standard Mohs technique in 1 stages(s).  MART 1 stains were performed on 1 specimens. CLEAR MARGINS OBTAINED and Final defect size was 1.3 cm.     We discussed the options for wound management in full with the patient including risks/benefits/ possible outcomes.        REPAIR SECOND INTENT: We discussed the options for wound management in full with the patient including risks/benefits/possible outcomes. Decision made to allow the wound to heal by second intention. Cautery was used for for hemostasis. EBL minimal; complications none; wound care routine.  The patient was discharged in good condition and will return in one month or prn for wound evaluation.  It was a pleasure speaking to Shobha Hairston today.  Previous clinic notes and pertinent laboratory tests were reviewed prior to Shobha Hairston's visit.  Signs and Symptoms of skin cancer discussed with patient.  Patient encouraged to perform monthly skin exams.  UV precautions reviewed with patient.  Return to clinic 6 months        Again, thank you for allowing me to participate in the care of your patient.        Sincerely,        Los Bundy MD

## 2023-08-02 LAB
BKR LAB AP GYN ADEQUACY: ABNORMAL
BKR LAB AP GYN INTERPRETATION: ABNORMAL
BKR LAB AP HPV REFLEX: ABNORMAL
BKR LAB AP PREVIOUS ABNL DX: ABNORMAL
BKR LAB AP PREVIOUS ABNORMAL: ABNORMAL
PATH REPORT.COMMENTS IMP SPEC: ABNORMAL
PATH REPORT.COMMENTS IMP SPEC: ABNORMAL
PATH REPORT.RELEVANT HX SPEC: ABNORMAL

## 2023-08-03 ENCOUNTER — PATIENT OUTREACH (OUTPATIENT)
Dept: FAMILY MEDICINE | Facility: CLINIC | Age: 43
End: 2023-08-03
Payer: COMMERCIAL

## 2023-08-03 LAB
HUMAN PAPILLOMA VIRUS 16 DNA: NEGATIVE
HUMAN PAPILLOMA VIRUS 18 DNA: NEGATIVE
HUMAN PAPILLOMA VIRUS FINAL DIAGNOSIS: NORMAL
HUMAN PAPILLOMA VIRUS OTHER HR: NEGATIVE

## 2023-08-16 DIAGNOSIS — J45.20 MILD INTERMITTENT ASTHMA WITHOUT COMPLICATION: ICD-10-CM

## 2023-08-16 RX ORDER — BUDESONIDE 180 UG/1
1 AEROSOL, POWDER RESPIRATORY (INHALATION) 2 TIMES DAILY
Refills: 11 | OUTPATIENT
Start: 2023-08-16

## 2023-08-16 NOTE — TELEPHONE ENCOUNTER
Duplicate request. Refill(s) on file from 8/2/23. Request denied. [facility] pharmacy.      Disp Refills Start End LUCIANA   budesonide (PULMICORT FLEXHALER) 180 MCG/ACT inhaler 1 each 11 8/2/2023 10/31/2023 No   Sig - Route: Inhale 1 puff into the lungs 2 times daily for 90 days - Inhalation   Sent to pharmacy as: Budesonide 180 MCG/ACT Inhalation Aerosol Powder Breath Activated (PULMICORT FLEXHALER)   Class: E-Prescribe   Order: [id number]   E-Prescribing Status: Receipt confirmed by pharmacy (8/2/2023  2:06 PM CDT)     Printout Tracking    External Result Report     Pharmacy    [id number] - [place], [place] - [address] [place] [address] E

## 2023-08-24 ENCOUNTER — TELEPHONE (OUTPATIENT)
Dept: FAMILY MEDICINE | Facility: CLINIC | Age: 43
End: 2023-08-24
Payer: COMMERCIAL

## 2023-08-24 DIAGNOSIS — J45.20 MILD INTERMITTENT ASTHMA WITHOUT COMPLICATION: Primary | ICD-10-CM

## 2023-08-24 NOTE — TELEPHONE ENCOUNTER
Patient called stating she has been waiting a couple weeks for a PA for her inhaler states QVAIR is not covered and pulmicort inhaler was prescribed but this is on back order for an undetermined time per at [facility] in [place].    Writer spoke with Ck pharmacist at [facility] who recommends alternative of flovent, fluticasone or asmanex.    Requesting new RX for one of the above medications be sent to [facility] in [place] [facility] as she has been waiting since 8/1/23.    Message routed to covering provider for consideration.     Julie Behrendt RN

## 2023-08-26 ENCOUNTER — HOSPITAL ENCOUNTER (OUTPATIENT)
Dept: MAMMOGRAPHY | Facility: CLINIC | Age: 43
Discharge: HOME | End: 2023-08-26
Attending: FAMILY MEDICINE | Admitting: FAMILY MEDICINE
Payer: COMMERCIAL

## 2023-08-26 DIAGNOSIS — Z12.31 ENCOUNTER FOR SCREENING MAMMOGRAM FOR BREAST CANCER: ICD-10-CM

## 2023-08-26 PROCEDURE — 77067 SCR MAMMO BI INCL CAD: CPT

## 2023-09-15 ENCOUNTER — HOSPITAL ENCOUNTER (OUTPATIENT)
Dept: CARDIOLOGY | Facility: CLINIC | Age: 43
Discharge: HOME | End: 2023-09-15
Attending: FAMILY MEDICINE | Admitting: FAMILY MEDICINE
Payer: COMMERCIAL

## 2023-09-15 DIAGNOSIS — R07.9 CHEST PAIN, UNSPECIFIED TYPE: ICD-10-CM

## 2023-09-15 PROCEDURE — 93350 STRESS TTE ONLY: CPT | Mod: 26 | Performed by: INTERNAL MEDICINE

## 2023-09-15 PROCEDURE — 93016 CV STRESS TEST SUPVJ ONLY: CPT | Performed by: INTERNAL MEDICINE

## 2023-09-15 PROCEDURE — 93325 DOPPLER ECHO COLOR FLOW MAPG: CPT | Mod: 26 | Performed by: INTERNAL MEDICINE

## 2023-09-15 PROCEDURE — 999N000208 ECHO STRESS ECHOCARDIOGRAM

## 2023-09-15 PROCEDURE — 93321 DOPPLER ECHO F-UP/LMTD STD: CPT | Mod: 26 | Performed by: INTERNAL MEDICINE

## 2023-09-15 PROCEDURE — 255N000002 HC RX 255 OP 636: Performed by: FAMILY MEDICINE

## 2023-09-15 PROCEDURE — 93018 CV STRESS TEST I&R ONLY: CPT | Performed by: INTERNAL MEDICINE

## 2023-09-15 PROCEDURE — 93321 DOPPLER ECHO F-UP/LMTD STD: CPT | Mod: TC

## 2023-09-15 RX ADMIN — HUMAN ALBUMIN MICROSPHERES AND PERFLUTREN 2 ML: 10; .22 INJECTION, SOLUTION INTRAVENOUS at 10:25

## 2023-12-14 ENCOUNTER — OFFICE VISIT (OUTPATIENT)
Dept: DERMATOLOGY | Facility: CLINIC | Age: 43
End: 2023-12-14
Payer: COMMERCIAL

## 2023-12-14 DIAGNOSIS — L81.4 LENTIGO: ICD-10-CM

## 2023-12-14 DIAGNOSIS — Z51.81 THERAPEUTIC DRUG MONITORING: Primary | ICD-10-CM

## 2023-12-14 DIAGNOSIS — D22.9 NEVUS: ICD-10-CM

## 2023-12-14 DIAGNOSIS — L40.9 PSORIASIS: ICD-10-CM

## 2023-12-14 DIAGNOSIS — L82.1 SEBORRHEIC KERATOSIS: ICD-10-CM

## 2023-12-14 DIAGNOSIS — D18.01 ANGIOMA OF SKIN: ICD-10-CM

## 2023-12-14 DIAGNOSIS — Z86.018 HISTORY OF DYSPLASTIC NEVUS: ICD-10-CM

## 2023-12-14 LAB
ALBUMIN SERPL BCG-MCNC: 4.5 G/DL (ref 3.5–5.2)
ALP SERPL-CCNC: 49 U/L (ref 40–150)
ALT SERPL W P-5'-P-CCNC: 11 U/L (ref 0–50)
ANION GAP SERPL CALCULATED.3IONS-SCNC: 9 MMOL/L (ref 7–15)
AST SERPL W P-5'-P-CCNC: 15 U/L (ref 0–45)
BILIRUB SERPL-MCNC: 0.3 MG/DL
BUN SERPL-MCNC: 11.3 MG/DL (ref 6–20)
CALCIUM SERPL-MCNC: 9.7 MG/DL (ref 8.6–10)
CHLORIDE SERPL-SCNC: 104 MMOL/L (ref 98–107)
CREAT SERPL-MCNC: 0.62 MG/DL (ref 0.51–0.95)
DEPRECATED HCO3 PLAS-SCNC: 25 MMOL/L (ref 22–29)
EGFRCR SERPLBLD CKD-EPI 2021: >90 ML/MIN/1.73M2
ERYTHROCYTE [DISTWIDTH] IN BLOOD BY AUTOMATED COUNT: 13.4 % (ref 10–15)
GLUCOSE SERPL-MCNC: 104 MG/DL (ref 70–99)
HCT VFR BLD AUTO: 35.7 % (ref 35–47)
HGB BLD-MCNC: 11.8 G/DL (ref 11.7–15.7)
MCH RBC QN AUTO: 30.8 PG (ref 26.5–33)
MCHC RBC AUTO-ENTMCNC: 33.1 G/DL (ref 31.5–36.5)
MCV RBC AUTO: 93 FL (ref 78–100)
PLATELET # BLD AUTO: 323 10E3/UL (ref 150–450)
POTASSIUM SERPL-SCNC: 4 MMOL/L (ref 3.4–5.3)
PROT SERPL-MCNC: 7.7 G/DL (ref 6.4–8.3)
RBC # BLD AUTO: 3.83 10E6/UL (ref 3.8–5.2)
SODIUM SERPL-SCNC: 138 MMOL/L (ref 135–145)
WBC # BLD AUTO: 10.6 10E3/UL (ref 4–11)

## 2023-12-14 PROCEDURE — 99214 OFFICE O/P EST MOD 30 MIN: CPT | Performed by: PHYSICIAN ASSISTANT

## 2023-12-14 PROCEDURE — 87340 HEPATITIS B SURFACE AG IA: CPT | Performed by: PHYSICIAN ASSISTANT

## 2023-12-14 PROCEDURE — 80053 COMPREHEN METABOLIC PANEL: CPT | Performed by: PHYSICIAN ASSISTANT

## 2023-12-14 PROCEDURE — 36415 COLL VENOUS BLD VENIPUNCTURE: CPT | Performed by: PHYSICIAN ASSISTANT

## 2023-12-14 PROCEDURE — 85027 COMPLETE CBC AUTOMATED: CPT | Performed by: PHYSICIAN ASSISTANT

## 2023-12-14 PROCEDURE — 86481 TB AG RESPONSE T-CELL SUSP: CPT | Performed by: PHYSICIAN ASSISTANT

## 2023-12-14 PROCEDURE — 86803 HEPATITIS C AB TEST: CPT | Performed by: PHYSICIAN ASSISTANT

## 2023-12-14 RX ORDER — FOLIC ACID 1 MG/1
TABLET ORAL
Qty: 90 TABLET | Refills: 3 | Status: SHIPPED | OUTPATIENT
Start: 2023-12-14

## 2023-12-14 RX ORDER — CLOBETASOL PROPIONATE 0.5 MG/G
CREAM TOPICAL
Qty: 60 G | Refills: 3 | Status: SHIPPED | OUTPATIENT
Start: 2023-12-14

## 2023-12-14 RX ORDER — METHOTREXATE 2.5 MG/1
TABLET ORAL
Qty: 28 TABLET | Refills: 0 | Status: SHIPPED | OUTPATIENT
Start: 2023-12-14

## 2023-12-14 ASSESSMENT — PAIN SCALES - GENERAL: PAINLEVEL: NO PAIN (0)

## 2023-12-14 NOTE — NURSING NOTE
Shobha Hairston's chief complaint for this visit includes:  Chief Complaint   Patient presents with    Skin Check     Patient is here for skin check. Patient would like to discuss her arthritis. Patient had mohs completed on left chest and would like to have it looked at.      PCP: Sadaf Brock    Referring Provider:  No referring provider defined for this encounter.    There were no vitals taken for this visit.  No Pain (0)        Allergies   Allergen Reactions    Morphine Anaphylaxis    Codeine Nausea         Do you need any medication refills at today's visit? Yes- clobetasol    Nichelle Villegas MA

## 2023-12-14 NOTE — PATIENT INSTRUCTIONS
Methotrexate is a medication used in low doses to treat inflammatory skin conditions such as psoriasis and eczema/dermatitis. It is also prescribed for rheumatoid arthritis, psoriatic arthritis, and increasingly, other inflammatory and autoimmune disorders (off-label). In much higher doses, it is used as a chemotherapy agent for leukemia and some other forms of cancer.    For responding skin diseases, methotrexate usually shows some benefit within 6 to 8 weeks. Maximum effects are generally achieved within 5 to 6 months, depending on dose escalation.    Side effects of methotrexate  Side effects can occur at any time during treatment with methotrexate, but are most common in the first few weeks. Folic acid supplements, is thought to reduce some of the side effects of methotrexate.     If the side effects described below or other problems trouble you, or should you develop any signs of infection or unusual bleeding, notify your doctor promptly and before your next dose of methotrexate is due.      The most common side effects of methotrexate are loss of appetite, nausea and diarrhea, and affect about one in 12 patients. These side effects are usually temporary, but changes in dose and/or supplemental folic acid tablets may be helpful.    An overdose of methotrexate or deficiency of the vitamin folic acid may result in anemia , reduced white cell count, risking serious infections, and low platelet count, resulting in bruising and bleeding.    Methotrexate is stored by the liver. Transaminase liver enzyme levels may rise for a few days after treatment but they quickly return to normal.     Long term therapy may be associated with scarring (fibrosis or cirrhosis) of the liver. This is more commonly due to other reasons such as fatty liver, diabetes, hyperlipidemia, and obesity (ie metabolic syndrome), but can also develop from viral hepatitis and alcohol.    Methotrexate can rarely cause a lung reaction similar to  pneumonia called acute pneumonitis or interstitial pneumonia.      Today  -  Some baseline laboratory tests will be checked  - A prescription for folic acid will be sent to pharmacy for you to start.  (This is a vitamin that can help reduce the side effects of methotrexate)  -  A test dose of the medication to the pharmacy (3 tablets)  -  Take all of the pills in the methotrexate prescription on the same day  -  Recheck your labs at any [facility] 7 days from the date you take the test dose  -  Once your blood work is complete, our office we will call and let you know if you can continue methotrexate. You will then increase to 5 tablets weekly  -  follow-up in 4-6 weeks

## 2023-12-14 NOTE — PROGRESS NOTES
HPI:   Chief complaints: Shobha (pronoiunced Kayli-ta) TOOTIE Hairston is a pleasant 43 year old female who presents for Full skin cancer screening to rule out skin cancer   Last Skin Exam: n/a      1st Baseline: yes  Personal HX of Skin Cancer: no   Personal HX of Malignant Melanoma: no   Family HX of Skin Cancer / Malignant Melanoma: no  Personal HX of Atypical Moles:   yes severely dysplastic nevus on the chest  Risk factors: history of sun exposure and burns  New / Changing lesions:yes psoriasis - her joints have been flaring. She has AM joint pain in her hands and elbow which lasts several hours.   Social History: [employer]  On review of systems, there are no further skin complaints, patient is feeling otherwise well.   ROS of the following were done and are negative: Constitutional, Eyes, Ears, Nose,   Mouth, Throat, Cardiovascular, Respiratory, GI, Genitourinary, Musculoskeletal,   Psychiatric, Endocrine, Allergic/Immunologic.    PHYSICAL EXAM:   There were no vitals taken for this visit.  Skin exam performed as follows: Type 2 skin. Mood appropriate  Alert and Oriented X 3. Well developed, well nourished in no distress.  General appearance: Normal  Head including face: Normal  Eyes: conjunctiva and lids: Normal  Mouth: Lips, teeth, gums: Normal  Neck: Normal  Chest-breast/axillae: Normal  Back: Normal  Extremities: digits/nails (clubbing): Normal  Eccrine and Apocrine glands: Normal  Right upper extremity: Normal  Left upper extremity: Normal  Right lower extremity: Normal  Left lower extremity: Normal  Skin: Scalp and body hair: See below    Pt deferred exam of breasts, groin, buttocks: No    Other physical findings:  1. Multiple pigmented macules on extremities and trunk  2. Multiple pigmented macules on face, trunk and extremities  3. Multiple vascular papules on trunk, arms and legs  4. Multiple scattered keratotic plaques  5. Psoriasiform dermatitis on the hands       Except as noted above, no other signs  of skin cancer or melanoma.     ASSESSMENT/PLAN:   Benign Full skin cancer screening today. . Patient with history of severely dysplastic nevus  Advised on monthly self exams and 1 year  Patient Education: Appropriate brochures given.    Multiple benign appearing melanocytic nevi on arms, legs and trunk. Discussed ABCDEs of melanoma and sunscreen.   Multiple lentigos on arms, legs and trunk. Advised benign, no treatment needed.  Multiple scattered angiomas. Advised benign, no treatment needed.   Seborrheic keratosis on arms, legs and trunk. Advised benign, no treatment needed.  Psoriasis, PsA  --3 tablets of methotrexate all at once week #1; increase to 5 tablets starting week #2  --Folic acid on all other days  --CBC, CMP, hep panel checked today  --Recheck CBC and CMP in 1 week  --Advised on potential side effects of increased infection (decreased immunity), liver dysfunction/damage, oncogenesis, decreased blood counts. Advised to avoid EtOH and acetaminophen.   --Recheck in 1 month            Follow-up: 1 month for recheck     1.) Patient was asked about new and changing moles. YES  2.) Patient received a complete physical skin examination: YES  3.) Patient was counseled to perform a monthly self skin examination: YES  Scribed By: Kristi Yan, MS, PA-C

## 2023-12-14 NOTE — LETTER
12/14/2023         RE: Shobha Hairston  [address] [place] [address]        Dear Colleague,    Thank you for referring your patient, Shobha Hairston, to the [facility]. Please see a copy of my visit note below.    HPI:   Chief complaints: Shobha (pronoiunced Kayli-ta) TOOTIE Hairston is a pleasant 43 year old female who presents for Full skin cancer screening to rule out skin cancer   Last Skin Exam: n/a      1st Baseline: yes  Personal HX of Skin Cancer: no   Personal HX of Malignant Melanoma: no   Family HX of Skin Cancer / Malignant Melanoma: no  Personal HX of Atypical Moles:   yes severely dysplastic nevus on the chest  Risk factors: history of sun exposure and burns  New / Changing lesions:yes psoriasis - her joints have been flaring. She has AM joint pain in her hands and elbow which lasts several hours.   Social History: [employer]  On review of systems, there are no further skin complaints, patient is feeling otherwise well.   ROS of the following were done and are negative: Constitutional, Eyes, Ears, Nose,   Mouth, Throat, Cardiovascular, Respiratory, GI, Genitourinary, Musculoskeletal,   Psychiatric, Endocrine, Allergic/Immunologic.    PHYSICAL EXAM:   There were no vitals taken for this visit.  Skin exam performed as follows: Type 2 skin. Mood appropriate  Alert and Oriented X 3. Well developed, well nourished in no distress.  General appearance: Normal  Head including face: Normal  Eyes: conjunctiva and lids: Normal  Mouth: Lips, teeth, gums: Normal  Neck: Normal  Chest-breast/axillae: Normal  Back: Normal  Extremities: digits/nails (clubbing): Normal  Eccrine and Apocrine glands: Normal  Right upper extremity: Normal  Left upper extremity: Normal  Right lower extremity: Normal  Left lower extremity: Normal  Skin: Scalp and body hair: See below    Pt deferred exam of breasts, groin, buttocks: No    Other physical findings:  1. Multiple pigmented macules on  extremities and trunk  2. Multiple pigmented macules on face, trunk and extremities  3. Multiple vascular papules on trunk, arms and legs  4. Multiple scattered keratotic plaques  5. Psoriasiform dermatitis on the hands       Except as noted above, no other signs of skin cancer or melanoma.     ASSESSMENT/PLAN:   Benign Full skin cancer screening today. . Patient with history of severely dysplastic nevus  Advised on monthly self exams and 1 year  Patient Education: Appropriate brochures given.    Multiple benign appearing melanocytic nevi on arms, legs and trunk. Discussed ABCDEs of melanoma and sunscreen.   Multiple lentigos on arms, legs and trunk. Advised benign, no treatment needed.  Multiple scattered angiomas. Advised benign, no treatment needed.   Seborrheic keratosis on arms, legs and trunk. Advised benign, no treatment needed.  Psoriasis, PsA  --3 tablets of methotrexate all at once week #1; increase to 5 tablets starting week #2  --Folic acid on all other days  --CBC, CMP, hep panel checked today  --Recheck CBC and CMP in 1 week  --Advised on potential side effects of increased infection (decreased immunity), liver dysfunction/damage, oncogenesis, decreased blood counts. Advised to avoid EtOH and acetaminophen.   --Recheck in 1 month            Follow-up: 1 month for recheck     1.) Patient was asked about new and changing moles. YES  2.) Patient received a complete physical skin examination: YES  3.) Patient was counseled to perform a monthly self skin examination: YES  Scribed By: Kristi Yan, MS, P[place]N      Again, thank you for allowing me to participate in the care of your patient.        Sincerely,        Kristi Yan PA-C

## 2023-12-15 LAB
HBV SURFACE AG SERPL QL IA: NONREACTIVE
HCV AB SERPL QL IA: NONREACTIVE

## 2023-12-17 LAB
GAMMA INTERFERON BACKGROUND BLD IA-ACNC: 0.03 IU/ML
M TB IFN-G BLD-IMP: NEGATIVE
M TB IFN-G CD4+ BCKGRND COR BLD-ACNC: 9.97 IU/ML
MITOGEN IGNF BCKGRD COR BLD-ACNC: 0.01 IU/ML
MITOGEN IGNF BCKGRD COR BLD-ACNC: 0.03 IU/ML
QUANTIFERON MITOGEN: 10 IU/ML
QUANTIFERON NIL TUBE: 0.03 IU/ML
QUANTIFERON TB1 TUBE: 0.04 IU/ML
QUANTIFERON TB2 TUBE: 0.06

## 2024-01-06 ENCOUNTER — HOSPITAL ENCOUNTER (EMERGENCY)
Facility: CLINIC | Age: 44
Discharge: HOME | End: 2024-01-06
Attending: EMERGENCY MEDICINE | Admitting: EMERGENCY MEDICINE
Payer: COMMERCIAL

## 2024-01-06 ENCOUNTER — APPOINTMENT (OUTPATIENT)
Dept: RADIOLOGY | Facility: CLINIC | Age: 44
End: 2024-01-06
Attending: EMERGENCY MEDICINE
Payer: COMMERCIAL

## 2024-01-06 VITALS
HEART RATE: 72 BPM | DIASTOLIC BLOOD PRESSURE: 66 MMHG | OXYGEN SATURATION: 99 % | RESPIRATION RATE: 16 BRPM | SYSTOLIC BLOOD PRESSURE: 102 MMHG | BODY MASS INDEX: 22.67 KG/M2 | WEIGHT: 130 LBS | TEMPERATURE: 98.4 F

## 2024-01-06 DIAGNOSIS — S90.00XA CONTUSION OF ANKLE, INITIAL ENCOUNTER: ICD-10-CM

## 2024-01-06 PROCEDURE — 73610 X-RAY EXAM OF ANKLE: CPT | Mod: RT

## 2024-01-06 PROCEDURE — 99283 EMERGENCY DEPT VISIT LOW MDM: CPT

## 2024-01-06 PROCEDURE — 250N000013 HC RX MED GY IP 250 OP 250 PS 637: Performed by: EMERGENCY MEDICINE

## 2024-01-06 PROCEDURE — 73630 X-RAY EXAM OF FOOT: CPT | Mod: RT

## 2024-01-06 RX ORDER — ACETAMINOPHEN 325 MG/1
975 TABLET ORAL ONCE
Status: COMPLETED | OUTPATIENT
Start: 2024-01-06 | End: 2024-01-06

## 2024-01-06 RX ADMIN — ACETAMINOPHEN 975 MG: 325 TABLET ORAL at 10:55

## 2024-01-06 ASSESSMENT — ENCOUNTER SYMPTOMS: ARTHRALGIAS: 1

## 2024-01-06 ASSESSMENT — ACTIVITIES OF DAILY LIVING (ADL): ADLS_ACUITY_SCORE: 35

## 2024-01-06 NOTE — ED TRIAGE NOTES
Slipped and fell on ice on the deck on Tuesday.  Has pain to right elbow and hip.  But right ankle hurts the wrost.  Swollen and not improving     Triage Assessment (Adult)       Row Name 01/06/24 1039          Triage Assessment    Airway WDL WDL        Respiratory WDL    Respiratory WDL WDL        Skin Circulation/Temperature WDL    Skin Circulation/Temperature WDL WDL        Cardiac WDL    Cardiac WDL WDL        Peripheral/Neurovascular WDL    Peripheral Neurovascular WDL WDL        Cognitive/Neuro/Behavioral WDL    Cognitive/Neuro/Behavioral WDL WDL

## 2024-01-06 NOTE — ED PROVIDER NOTES
Emergency Department Encounter      NAME: Shobha Hairston  AGE: 43 year old female  YOB: 1980  MRN: [id number]  EVALUATION DATE & TIME: No admission date for patient encounter.    PCP: No primary care provider on file.    ED PROVIDER: Nino Rosales M.D.      Chief Complaint   Patient presents with    Ankle Pain    Hip Pain    Fall    Elbow Injury         FINAL IMPRESSION:  1. Contusion of ankle, initial encounter        MEDICAL DECISION MAKIN:00 AM I met with the patient, obtained history, performed an initial exam, and discussed options and plan for diagnostics and treatment here in the ED.   11:41 AM I rechecked on the patient and updated them on lab results and the plan.    This patient is a 43-year-old female with a history of tobacco use, alcohol abuse and bipolar disorder who presents after a fall.  She says that she slipped and fell on  causing a inversion injury to her right ankle.  She also says that she fell on her right hip and elbow causing some bruising.  She was able to ambulate afterward.  On exam she had tenderness on the right lateral malleolus no deformity.  The Achilles tendon was intact manage she had no tenderness over the fifth metatarsal.  X-rays were done of the right ankle which did not show any acute fracture.  I independently reviewed and interpreted the x-ray.  X-ray was done of the right foot and did not show any acute fracture or malalignment.  I discussed conservative treatment with the patient at home and she is going to continue using Tylenol for the pain.  While in the ER we use an Ace wrap to apply compression to the ankle as well as ice pack.    Pertinent Labs & Imaging studies reviewed. (See chart for details)    The importance of close follow up was discussed. We reviewed warning signs and symptoms, and I instructed Ms. Hairston to return to the emergency department immediately if she develops any new or worsening symptoms. I provided  additional verbal discharge instructions. Ms. Hairston expressed understanding and agreement with this plan of care, her questions were answered, and she was discharged in stable condition.     Medical Decision Making     History:  Supplemental history from: Documented in chart, if applicable  External Record(s) reviewed: Documented in chart, if applicable.     Work Up:  Chart documentation includes differential considered and any EKGs or imaging independently interpreted by provider, where specified.  In additional to work up documented, I considered the following work up: Documented in chart, if applicable.     External consultation:  Discussion of management with another provider: Documented in chart, if applicable     Complicating factors:  Care impacted by chronic illness: N/A  Care affected by social determinants of health: Access to Medical Care     Disposition considerations: Discharge with recommendations to continue using Tylenol and ibuprofen for the pain.      MEDICATIONS GIVEN IN THE EMERGENCY:  Medications   acetaminophen (TYLENOL) tablet 975 mg (975 mg Oral $Given 1/6/24 1055)       NEW PRESCRIPTIONS STARTED AT TODAY'S ER VISIT:  Discharge Medication List as of 1/6/2024 11:41 AM             =================================================================    HPI    Patient information was obtained from: patient    Use of : N/A        Shobha Hairston is a 43 year old female with a past medical history of ovarian cyst, tobacco use, alcohol abuse, kidney stones, and bipolar I disorder, who presents fall. On Tuesday 1/2/23, the patient was walking on her deck when she slipped and fell. The patient has right ankle swelling and pain as well as right hip and elbow bruising. She fell on the right side of her ankle and it did not bend. The patient is able to walk. No additional complaints at this time.      REVIEW OF SYSTEMS   Review of Systems   Musculoskeletal:  Positive for arthralgias.         PAST MEDICAL HISTORY:  Past Medical History:   Diagnosis Date    Alcohol abuse     Created by Conversion  Replacement Utility updated for latest IMO load    Allergic rhinitis     Created by Conversion  Replacement Utility updated for latest IMO load    Attention deficit disorder with hyperactivity(314.01)     Created by Conversion     Bipolar I disorder, single manic episode (H)     Created by Conversion  Replacement Utility updated for latest IMO load    Calculus of kidney     Created by Conversion [facility] Annotation: Sep 28 2007  4:38PM - Vanessa Garner: '02, '03     History of methamphetamine abuse (H)     Mild intermittent asthma without complication     Created by Conversion     Other abnormal Papanicolaou smear of cervix and cervical HPV(795.09)     Created by Conversion     Overweight (BMI 25.0-29.9)     Created by Conversion     Pyelonephritis     Created by Conversion  Replacement Utility updated for latest IMO load    Skin cancer     Tobacco abuse 12/21/2015       PAST SURGICAL HISTORY:  Past Surgical History:   Procedure Laterality Date    HC FRAGMENTING OF KIDNEY STONE      Description: Lithotripsy;  Recorded: 09/28/2007;    ZZC REPR VSD      Description: VSD Repair Single;  Recorded: 09/28/2007;       CURRENT MEDICATIONS:    No current facility-administered medications for this encounter.    Current Outpatient Medications:     albuterol (PROAIR HFA/PROVENTIL HFA/VENTOLIN HFA) 108 (90 Base) MCG/ACT inhaler, Inhale 2 puffs into the lungs every 6 hours as needed for wheezing, Disp: 18 g, Rfl: 0    budesonide (PULMICORT FLEXHALER) 180 MCG/ACT inhaler, Inhale 1 puff into the lungs 2 times daily for 90 days, Disp: 1 each, Rfl: 11    clobetasol (TEMOVATE) 0.05 % external cream, Apply to AA BID x 1-2 weeks then PRN. Do not apply to face., Disp: 60 g, Rfl: 3    econazole nitrate 1 % external cream, Apply topically daily (Patient not taking: Reported on 12/14/2023), Disp: 85 g, Rfl: 1    fluticasone  (FLONASE) 50 mcg/actuation nasal spray, [FLUTICASONE (FLONASE) 50 MCG/ACTUATION NASAL SPRAY] USE ONE TO TWO SPRAY(S) IN EACH NOSTRIL ONCE DAILY (Patient not taking: Reported on 7/28/2023), Disp: 16 g, Rfl: 2    fluticasone (FLOVENT DISKUS) 50 MCG/ACT inhaler, Inhale 1 puff into the lungs every 12 hours (Patient not taking: Reported on 12/14/2023), Disp: 60 each, Rfl: 3    folic acid (FOLVITE) 1 MG tablet, 1 tablet daily on all days not taking methotrexate, Disp: 90 tablet, Rfl: 3    ibuprofen (ADVIL,MOTRIN) 600 MG tablet, [IBUPROFEN (ADVIL,MOTRIN) 600 MG TABLET] Take 1 tablet (600 mg total) by mouth every 6 (six) hours as needed for pain. (Patient not taking: Reported on 12/14/2023), Disp: 60 tablet, Rfl: 1    indomethacin (INDOCIN) 50 MG capsule, Take 1 capsule (50 mg) by mouth 2 times daily (with meals) (Patient not taking: Reported on 12/14/2023), Disp: 30 capsule, Rfl: 0    methotrexate 2.5 MG tablet, 3 tablets all at once week 1 then increase to 5 tablets all at once one time per week starting week 2, Disp: 28 tablet, Rfl: 0    ALLERGIES:  Allergies   Allergen Reactions    Morphine Anaphylaxis    Codeine Nausea       FAMILY HISTORY:  Family History   Problem Relation Age of Onset    Coronary Artery Disease Mother     Thyroid Disease Mother     Diabetes Type 2  Mother     Hypertension Mother        SOCIAL HISTORY:   Social History     Socioeconomic History    Marital status: Single    Number of children: 1   Tobacco Use    Smoking status: Every Day     Packs/day: 0.50     Years: 23.00     Additional pack years: 0.00     Total pack years: 11.50     Types: Cigarettes    Smokeless tobacco: Never   Vaping Use    Vaping Use: Every day   Substance and Sexual Activity    Alcohol use: Not Currently    Drug use: No     Types: Marijuana     Comment: Drug use: past use    Sexual activity: Yes     Partners: Male     Birth control/protection: None   Social History Narrative    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 [place]         PHYSICAL EXAM:    Vitals: /66   Pulse 72   Temp 98.4  F (36.9  C) (Oral)   Resp 16   Wt 59 kg (130 lb)   LMP 12/07/2023 (Approximate)   SpO2 99%   BMI 22.67 kg/m     Constitutional: Well developed, well nourished. Comfortable appearing.  HEAD:Normocephalic, atraumatic,   Musculoskeletal: Tenderness over the right lateral malleolus. No tenderness on proximal tib/fib or metatarsals. Moving all 4 extremities intentionally and without pain. No obvious deformity.  Skin: Warm, dry, no rash.  Neurologic: Alert & oriented x 3, speech clear, moving all extremities spontaneously. Tenderness over the right lateral malleolus. No tenderness on proximal tib/fib or metatarsals.  Psychiatric: Affect normal, cooperative.     LAB:  All pertinent labs reviewed and interpreted.  Labs Ordered and Resulted from Time of ED Arrival to Time of ED Departure - No data to display    RADIOLOGY:  XR Foot Right 3 Views   Final Result   IMPRESSION: No acute fracture or malalignment. Mild first MTP joint degenerative changes. Status post bunionectomy and healed first metatarsal osteotomy with screw fixation. No hardware complication.      XR Ankle Right 3 Views   Final Result   IMPRESSION: No acute fracture is identified. Chronic ossicle at the lateral malleolus distally. There is normal joint spacing and alignment. The ankle mortise is congruent. The talar dome is unremarkable. Screw fixation in the first metatarsal, partially    imaged.        PROCEDURES:   Procedures       I, Otto Alas, am serving as a scribe to document services personally performed by Dr. Nino Rosales based on my observation and the provider's statements to me. I, Nino Rosales M.D. attest that Otto Alas is acting in a scribe capacity, has observed my performance of the services and has documented them in accordance with my direction.      Nino Rosales M.D.  Emergency Medicine  [facility]  EMERGENCY ROOM  [address] [place] [id number]  [phone]  Dept: [phone]     Nino Rosales MD  02/07/24 1452

## 2024-01-08 ENCOUNTER — PATIENT OUTREACH (OUTPATIENT)
Dept: FAMILY MEDICINE | Facility: CLINIC | Age: 44
End: 2024-01-08
Payer: COMMERCIAL

## 2024-01-08 NOTE — TELEPHONE ENCOUNTER
What type of discharge? Emergency Department  Risk of Hospital admission or ED visit: 63%  Is a TCM episode required? No  When should the patient follow up with PCP? 7 days of discharge.

## 2024-01-08 NOTE — TELEPHONE ENCOUNTER
"ED/Discharge Protocol    \"Hi, my name is Bing Peña RN, a registered nurse, and I am calling on behalf of Dr. Moscoso's office at [place].  I am calling to follow up and see how things are going for you after your recent visit.\"    \"I see that you were in the (ER/UC/IP) on 1/6/24.    How are you doing now that you are home?\" Patient reports some improvement    Is patient experiencing symptoms that may require a hospital visit?  no    Discharge Instructions    \"Let's review your discharge instructions.  What is/are the follow-up recommendations?  Pt. Response: patient expressed understanding    \"Were you instructed to make a follow-up appointment?\"  Pt. Response: No.       \"When you see the provider, I would recommend that you bring your discharge instructions with you.      Call Summary    \"Do you have any questions or concerns about your condition or care plan at the moment?\"    no  Triage nurse advice given: Follow AVS and attend scheduled appointments.     \"If you have questions or things don't continue to improve, we encourage you contact us through the main clinic number,  [phone].  Even if the clinic is not open, triage nurses are available 24/7 to help you.     We would like you to know that our clinic has extended hours (provide information).  We also have urgent care (provide details on closest location and hours/contact info)\"      \"Thank you for your time and take care!\"    Bing Peña RN on 1/8/2024 at 2:56 PM    "

## 2024-02-13 NOTE — TELEPHONE ENCOUNTER
FYI to provider - Patient is lost to pap tracking follow-up. Attempts to contact pt have been made per reminder process and there has been no reply and/or no appt scheduled. Contact hx listed below.     4/2009 ASC-H  11/3/09 NIL  1/19/10 Colpo ECC neg  12/17/10 ASC-H and AGC-EC  1/16/12 NIL  9/12/14 NIL pap, neg HPV  6/27/16 ASCUS, neg HPV  7/28/23 AGC-NOS, neg HPV. Plan: colposcopy with ECC and EMB due before 10/28/23  8/4/23 Pt notified   9/21/23 Reminder mychart  10/23/23 [place] not done. Tracking updated for 6 mo colp/pap due 1/28/24.    1/9/24 Reminder mychart -- read  2/13/24 Lost to follow-up for pap tracking     Nina Cantu RN BSN, Pap Tracking

## 2024-05-06 ENCOUNTER — MYC MEDICAL ADVICE (OUTPATIENT)
Dept: FAMILY MEDICINE | Facility: CLINIC | Age: 44
End: 2024-05-06
Payer: COMMERCIAL

## 2024-05-06 NOTE — TELEPHONE ENCOUNTER
Patient Quality Outreach    Patient is due for the following:   Asthma  -  ACT needed    Next Steps:   Patient was assigned appropriate questionnaire to complete    Type of outreach:    Sent [employer] message.      Questions for provider review:    None           Pari Andrew, CMA

## 2024-11-03 ENCOUNTER — HEALTH MAINTENANCE LETTER (OUTPATIENT)
Age: 44
End: 2024-11-03

## 2025-07-28 ENCOUNTER — PATIENT OUTREACH (OUTPATIENT)
Dept: CARE COORDINATION | Facility: CLINIC | Age: 45
End: 2025-07-28
Payer: COMMERCIAL